# Patient Record
Sex: FEMALE | Race: WHITE | NOT HISPANIC OR LATINO | URBAN - METROPOLITAN AREA
[De-identification: names, ages, dates, MRNs, and addresses within clinical notes are randomized per-mention and may not be internally consistent; named-entity substitution may affect disease eponyms.]

---

## 2022-08-17 ENCOUNTER — HOSPITAL ENCOUNTER (INPATIENT)
Facility: HOSPITAL | Age: 72
LOS: 6 days | Discharge: NURSING, REHAB OR LONG-TERM CARE | DRG: 521 | End: 2022-08-23
Attending: EMERGENCY MEDICINE | Admitting: INTERNAL MEDICINE
Payer: MEDICARE

## 2022-08-17 DIAGNOSIS — S72.002A LEFT DISPLACED FEMORAL NECK FRACTURE: ICD-10-CM

## 2022-08-17 DIAGNOSIS — R09.02 HYPOXIA: ICD-10-CM

## 2022-08-17 DIAGNOSIS — J18.9 ATYPICAL PNEUMONIA: ICD-10-CM

## 2022-08-17 DIAGNOSIS — D72.829 LEUKOCYTOSIS, UNSPECIFIED TYPE: ICD-10-CM

## 2022-08-17 DIAGNOSIS — D64.9 POSTOPERATIVE ANEMIA: ICD-10-CM

## 2022-08-17 DIAGNOSIS — S72.002A LEFT DISPLACED FEMORAL NECK FRACTURE: Primary | ICD-10-CM

## 2022-08-17 DIAGNOSIS — N17.9 AKI (ACUTE KIDNEY INJURY): ICD-10-CM

## 2022-08-17 DIAGNOSIS — I10 PRIMARY HYPERTENSION: ICD-10-CM

## 2022-08-17 DIAGNOSIS — R07.9 CHEST PAIN: ICD-10-CM

## 2022-08-17 DIAGNOSIS — K59.09 CHRONIC CONSTIPATION: ICD-10-CM

## 2022-08-17 DIAGNOSIS — B20 HIV DISEASE: ICD-10-CM

## 2022-08-17 DIAGNOSIS — Z01.818 ENCOUNTER FOR PREOPERATIVE ANESTHESIOLOGY ASSESSMENT FOR VASCULAR SURGERY: ICD-10-CM

## 2022-08-17 LAB
ALBUMIN SERPL-MCNC: 2.8 GM/DL (ref 3.4–4.8)
ALBUMIN/GLOB SERPL: 0.8 RATIO (ref 1.1–2)
ALP SERPL-CCNC: 62 UNIT/L (ref 40–150)
ALT SERPL-CCNC: 19 UNIT/L (ref 0–55)
AST SERPL-CCNC: 35 UNIT/L (ref 5–34)
BASOPHILS # BLD AUTO: 0.07 X10(3)/MCL (ref 0–0.2)
BASOPHILS NFR BLD AUTO: 0.5 %
BILIRUBIN DIRECT+TOT PNL SERPL-MCNC: 0.3 MG/DL
BUN SERPL-MCNC: 19.6 MG/DL (ref 9.8–20.1)
CALCIUM SERPL-MCNC: 9 MG/DL (ref 8.4–10.2)
CHLORIDE SERPL-SCNC: 106 MMOL/L (ref 98–107)
CO2 SERPL-SCNC: 24 MMOL/L (ref 23–31)
CREAT SERPL-MCNC: 1.22 MG/DL (ref 0.55–1.02)
EOSINOPHIL # BLD AUTO: 0.52 X10(3)/MCL (ref 0–0.9)
EOSINOPHIL NFR BLD AUTO: 3.9 %
ERYTHROCYTE [DISTWIDTH] IN BLOOD BY AUTOMATED COUNT: 14.6 % (ref 11.5–17)
GFR SERPLBLD CREATININE-BSD FMLA CKD-EPI: 47 MLS/MIN/1.73/M2
GLOBULIN SER-MCNC: 3.6 GM/DL (ref 2.4–3.5)
GLUCOSE SERPL-MCNC: 97 MG/DL (ref 82–115)
HCT VFR BLD AUTO: 32.9 % (ref 37–47)
HGB BLD-MCNC: 10.2 GM/DL (ref 12–16)
IMM GRANULOCYTES # BLD AUTO: 0.05 X10(3)/MCL (ref 0–0.04)
IMM GRANULOCYTES NFR BLD AUTO: 0.4 %
LYMPHOCYTES # BLD AUTO: 1 X10(3)/MCL (ref 0.6–4.6)
LYMPHOCYTES NFR BLD AUTO: 7.4 %
MCH RBC QN AUTO: 28.7 PG (ref 27–31)
MCHC RBC AUTO-ENTMCNC: 31 MG/DL (ref 33–36)
MCV RBC AUTO: 92.4 FL (ref 80–94)
MONOCYTES # BLD AUTO: 0.49 X10(3)/MCL (ref 0.1–1.3)
MONOCYTES NFR BLD AUTO: 3.6 %
NEUTROPHILS # BLD AUTO: 11.3 X10(3)/MCL (ref 2.1–9.2)
NEUTROPHILS NFR BLD AUTO: 84.2 %
NRBC BLD AUTO-RTO: 0 %
PLATELET # BLD AUTO: 223 X10(3)/MCL (ref 130–400)
PMV BLD AUTO: 9.2 FL (ref 7.4–10.4)
POTASSIUM SERPL-SCNC: 4.1 MMOL/L (ref 3.5–5.1)
PROT SERPL-MCNC: 6.4 GM/DL (ref 5.8–7.6)
RBC # BLD AUTO: 3.56 X10(6)/MCL (ref 4.2–5.4)
SODIUM SERPL-SCNC: 140 MMOL/L (ref 136–145)
WBC # SPEC AUTO: 13.4 X10(3)/MCL (ref 4.5–11.5)

## 2022-08-17 PROCEDURE — 99285 EMERGENCY DEPT VISIT HI MDM: CPT | Mod: 25

## 2022-08-17 PROCEDURE — 85025 COMPLETE CBC W/AUTO DIFF WBC: CPT | Performed by: EMERGENCY MEDICINE

## 2022-08-17 PROCEDURE — 63600175 PHARM REV CODE 636 W HCPCS: Performed by: EMERGENCY MEDICINE

## 2022-08-17 PROCEDURE — 11000001 HC ACUTE MED/SURG PRIVATE ROOM

## 2022-08-17 PROCEDURE — 80053 COMPREHEN METABOLIC PANEL: CPT | Performed by: EMERGENCY MEDICINE

## 2022-08-17 PROCEDURE — 87635 SARS-COV-2 COVID-19 AMP PRB: CPT | Performed by: EMERGENCY MEDICINE

## 2022-08-17 PROCEDURE — 36415 COLL VENOUS BLD VENIPUNCTURE: CPT | Performed by: EMERGENCY MEDICINE

## 2022-08-17 PROCEDURE — 25000003 PHARM REV CODE 250: Performed by: EMERGENCY MEDICINE

## 2022-08-17 RX ORDER — MIDODRINE HYDROCHLORIDE 5 MG/1
5 TABLET ORAL 2 TIMES DAILY WITH MEALS
COMMUNITY

## 2022-08-17 RX ORDER — DONEPEZIL HYDROCHLORIDE 5 MG/1
5 TABLET, FILM COATED ORAL NIGHTLY
COMMUNITY

## 2022-08-17 RX ORDER — ASPIRIN 81 MG/1
81 TABLET ORAL DAILY
COMMUNITY

## 2022-08-17 RX ORDER — PANTOPRAZOLE SODIUM 40 MG/1
40 TABLET, DELAYED RELEASE ORAL DAILY
COMMUNITY

## 2022-08-17 RX ORDER — ROSUVASTATIN CALCIUM 40 MG/1
10 TABLET, COATED ORAL NIGHTLY
COMMUNITY

## 2022-08-17 RX ORDER — AMLODIPINE BESYLATE 5 MG/1
5 TABLET ORAL DAILY
COMMUNITY

## 2022-08-17 RX ORDER — TRAZODONE HYDROCHLORIDE 100 MG/1
100 TABLET ORAL NIGHTLY
COMMUNITY

## 2022-08-17 RX ORDER — OXYBUTYNIN CHLORIDE 10 MG/1
10 TABLET, EXTENDED RELEASE ORAL DAILY
COMMUNITY

## 2022-08-17 RX ADMIN — AZITHROMYCIN MONOHYDRATE 500 MG: 500 INJECTION, POWDER, LYOPHILIZED, FOR SOLUTION INTRAVENOUS at 11:08

## 2022-08-17 RX ADMIN — CEFTRIAXONE SODIUM 1 G: 1 INJECTION, POWDER, FOR SOLUTION INTRAMUSCULAR; INTRAVENOUS at 11:08

## 2022-08-18 ENCOUNTER — ANESTHESIA (OUTPATIENT)
Dept: SURGERY | Facility: HOSPITAL | Age: 72
DRG: 521 | End: 2022-08-18
Payer: MEDICARE

## 2022-08-18 ENCOUNTER — ANESTHESIA EVENT (OUTPATIENT)
Dept: SURGERY | Facility: HOSPITAL | Age: 72
DRG: 521 | End: 2022-08-18
Payer: MEDICARE

## 2022-08-18 PROBLEM — S72.002A LEFT DISPLACED FEMORAL NECK FRACTURE: Status: ACTIVE | Noted: 2022-08-18

## 2022-08-18 LAB
ALBUMIN SERPL-MCNC: 2.9 GM/DL (ref 3.4–4.8)
ALBUMIN SERPL-MCNC: 3.2 GM/DL (ref 3.4–4.8)
ALBUMIN/GLOB SERPL: 0.8 RATIO (ref 1.1–2)
ALBUMIN/GLOB SERPL: 0.9 RATIO (ref 1.1–2)
ALP SERPL-CCNC: 156 UNIT/L (ref 40–150)
ALP SERPL-CCNC: 161 UNIT/L (ref 40–150)
ALT SERPL-CCNC: 166 UNIT/L (ref 0–55)
ALT SERPL-CCNC: 204 UNIT/L (ref 0–55)
AST SERPL-CCNC: 435 UNIT/L (ref 5–34)
AST SERPL-CCNC: 787 UNIT/L (ref 5–34)
BASOPHILS # BLD AUTO: 0.04 X10(3)/MCL (ref 0–0.2)
BASOPHILS NFR BLD AUTO: 0.4 %
BILIRUBIN DIRECT+TOT PNL SERPL-MCNC: 0.6 MG/DL
BILIRUBIN DIRECT+TOT PNL SERPL-MCNC: 0.9 MG/DL
BUN SERPL-MCNC: 15.3 MG/DL (ref 9.8–20.1)
BUN SERPL-MCNC: 18.6 MG/DL (ref 9.8–20.1)
CALCIUM SERPL-MCNC: 8.9 MG/DL (ref 8.4–10.2)
CALCIUM SERPL-MCNC: 9.1 MG/DL (ref 8.4–10.2)
CHLORIDE SERPL-SCNC: 105 MMOL/L (ref 98–107)
CHLORIDE SERPL-SCNC: 107 MMOL/L (ref 98–107)
CO2 SERPL-SCNC: 17 MMOL/L (ref 23–31)
CO2 SERPL-SCNC: 28 MMOL/L (ref 23–31)
CREAT SERPL-MCNC: 0.98 MG/DL (ref 0.55–1.02)
CREAT SERPL-MCNC: 1.41 MG/DL (ref 0.55–1.02)
EOSINOPHIL # BLD AUTO: 0.5 X10(3)/MCL (ref 0–0.9)
EOSINOPHIL NFR BLD AUTO: 5.3 %
ERYTHROCYTE [DISTWIDTH] IN BLOOD BY AUTOMATED COUNT: 14.6 % (ref 11.5–17)
GFR SERPLBLD CREATININE-BSD FMLA CKD-EPI: 39 MLS/MIN/1.73/M2
GFR SERPLBLD CREATININE-BSD FMLA CKD-EPI: >60 MLS/MIN/1.73/M2
GLOBULIN SER-MCNC: 3.2 GM/DL (ref 2.4–3.5)
GLOBULIN SER-MCNC: 4.1 GM/DL (ref 2.4–3.5)
GLUCOSE SERPL-MCNC: 83 MG/DL (ref 82–115)
GLUCOSE SERPL-MCNC: 91 MG/DL (ref 82–115)
HCT VFR BLD AUTO: 42.1 % (ref 37–47)
HGB BLD-MCNC: 12.5 GM/DL (ref 12–16)
IMM GRANULOCYTES # BLD AUTO: 0.03 X10(3)/MCL (ref 0–0.04)
IMM GRANULOCYTES NFR BLD AUTO: 0.3 %
LYMPHOCYTES # BLD AUTO: 0.77 X10(3)/MCL (ref 0.6–4.6)
LYMPHOCYTES NFR BLD AUTO: 8.1 %
MAGNESIUM SERPL-MCNC: 2 MG/DL (ref 1.6–2.6)
MCH RBC QN AUTO: 28.9 PG (ref 27–31)
MCHC RBC AUTO-ENTMCNC: 29.7 MG/DL (ref 33–36)
MCV RBC AUTO: 97.5 FL (ref 80–94)
MONOCYTES # BLD AUTO: 0.35 X10(3)/MCL (ref 0.1–1.3)
MONOCYTES NFR BLD AUTO: 3.7 %
NEUTROPHILS # BLD AUTO: 7.8 X10(3)/MCL (ref 2.1–9.2)
NEUTROPHILS NFR BLD AUTO: 82.2 %
NRBC BLD AUTO-RTO: 0 %
PHOSPHATE SERPL-MCNC: 4.6 MG/DL (ref 2.3–4.7)
PLATELET # BLD AUTO: 203 X10(3)/MCL (ref 130–400)
PMV BLD AUTO: 9.5 FL (ref 7.4–10.4)
POTASSIUM SERPL-SCNC: 4.3 MMOL/L (ref 3.5–5.1)
POTASSIUM SERPL-SCNC: 4.9 MMOL/L (ref 3.5–5.1)
PROT SERPL-MCNC: 6.1 GM/DL (ref 5.8–7.6)
PROT SERPL-MCNC: 7.3 GM/DL (ref 5.8–7.6)
RBC # BLD AUTO: 4.32 X10(6)/MCL (ref 4.2–5.4)
SARS-COV-2 RDRP RESP QL NAA+PROBE: NEGATIVE
SODIUM SERPL-SCNC: 140 MMOL/L (ref 136–145)
SODIUM SERPL-SCNC: 142 MMOL/L (ref 136–145)
WBC # SPEC AUTO: 9.5 X10(3)/MCL (ref 4.5–11.5)

## 2022-08-18 PROCEDURE — 11000001 HC ACUTE MED/SURG PRIVATE ROOM

## 2022-08-18 PROCEDURE — 63600175 PHARM REV CODE 636 W HCPCS

## 2022-08-18 PROCEDURE — 99223 1ST HOSP IP/OBS HIGH 75: CPT | Mod: 57,,, | Performed by: REHABILITATION UNIT

## 2022-08-18 PROCEDURE — 99223 PR INITIAL HOSPITAL CARE,LEVL III: ICD-10-PCS | Mod: 57,,, | Performed by: REHABILITATION UNIT

## 2022-08-18 PROCEDURE — 36415 COLL VENOUS BLD VENIPUNCTURE: CPT | Performed by: NURSE PRACTITIONER

## 2022-08-18 PROCEDURE — 83735 ASSAY OF MAGNESIUM: CPT | Performed by: NURSE PRACTITIONER

## 2022-08-18 PROCEDURE — 25000003 PHARM REV CODE 250: Performed by: INTERNAL MEDICINE

## 2022-08-18 PROCEDURE — 85025 COMPLETE CBC W/AUTO DIFF WBC: CPT | Performed by: NURSE PRACTITIONER

## 2022-08-18 PROCEDURE — 80053 COMPREHEN METABOLIC PANEL: CPT | Performed by: PHYSICIAN ASSISTANT

## 2022-08-18 PROCEDURE — 80053 COMPREHEN METABOLIC PANEL: CPT | Performed by: NURSE PRACTITIONER

## 2022-08-18 PROCEDURE — 36415 COLL VENOUS BLD VENIPUNCTURE: CPT | Performed by: PHYSICIAN ASSISTANT

## 2022-08-18 PROCEDURE — 84100 ASSAY OF PHOSPHORUS: CPT | Performed by: NURSE PRACTITIONER

## 2022-08-18 PROCEDURE — 27000221 HC OXYGEN, UP TO 24 HOURS

## 2022-08-18 PROCEDURE — 63600175 PHARM REV CODE 636 W HCPCS: Performed by: PHYSICIAN ASSISTANT

## 2022-08-18 PROCEDURE — 63600175 PHARM REV CODE 636 W HCPCS: Performed by: INTERNAL MEDICINE

## 2022-08-18 PROCEDURE — 25000003 PHARM REV CODE 250: Performed by: PHYSICIAN ASSISTANT

## 2022-08-18 RX ORDER — TRAZODONE HYDROCHLORIDE 100 MG/1
100 TABLET ORAL NIGHTLY
Status: DISCONTINUED | OUTPATIENT
Start: 2022-08-18 | End: 2022-08-23 | Stop reason: HOSPADM

## 2022-08-18 RX ORDER — PANTOPRAZOLE SODIUM 40 MG/1
40 TABLET, DELAYED RELEASE ORAL DAILY
Status: DISCONTINUED | OUTPATIENT
Start: 2022-08-19 | End: 2022-08-23 | Stop reason: HOSPADM

## 2022-08-18 RX ORDER — HYDRALAZINE HYDROCHLORIDE 20 MG/ML
10 INJECTION INTRAMUSCULAR; INTRAVENOUS
Status: DISCONTINUED | OUTPATIENT
Start: 2022-08-18 | End: 2022-08-23 | Stop reason: HOSPADM

## 2022-08-18 RX ORDER — AMLODIPINE BESYLATE 5 MG/1
5 TABLET ORAL DAILY
Status: DISCONTINUED | OUTPATIENT
Start: 2022-08-19 | End: 2022-08-23 | Stop reason: HOSPADM

## 2022-08-18 RX ORDER — GABAPENTIN 300 MG/1
300 CAPSULE ORAL 2 TIMES DAILY
Status: DISCONTINUED | OUTPATIENT
Start: 2022-08-18 | End: 2022-08-23 | Stop reason: HOSPADM

## 2022-08-18 RX ORDER — BACLOFEN 10 MG/1
10 TABLET ORAL 3 TIMES DAILY
Status: DISCONTINUED | OUTPATIENT
Start: 2022-08-18 | End: 2022-08-23 | Stop reason: HOSPADM

## 2022-08-18 RX ORDER — BACLOFEN 10 MG/1
10 TABLET ORAL 3 TIMES DAILY
COMMUNITY

## 2022-08-18 RX ORDER — MELATONIN 10 MG
CAPSULE ORAL
COMMUNITY

## 2022-08-18 RX ORDER — DOCUSATE SODIUM 100 MG/1
100 CAPSULE, LIQUID FILLED ORAL NIGHTLY
COMMUNITY

## 2022-08-18 RX ORDER — ACETAMINOPHEN 500 MG
5000 TABLET ORAL DAILY
COMMUNITY

## 2022-08-18 RX ORDER — MIDODRINE HYDROCHLORIDE 5 MG/1
5 TABLET ORAL 2 TIMES DAILY WITH MEALS
Status: DISCONTINUED | OUTPATIENT
Start: 2022-08-18 | End: 2022-08-23 | Stop reason: HOSPADM

## 2022-08-18 RX ORDER — VORTIOXETINE 10 MG/1
TABLET, FILM COATED ORAL
COMMUNITY

## 2022-08-18 RX ORDER — ENOXAPARIN SODIUM 100 MG/ML
40 INJECTION SUBCUTANEOUS EVERY 24 HOURS
Status: DISCONTINUED | OUTPATIENT
Start: 2022-08-18 | End: 2022-08-23 | Stop reason: HOSPADM

## 2022-08-18 RX ORDER — OXYBUTYNIN CHLORIDE 5 MG/1
5 TABLET ORAL DAILY
Status: DISCONTINUED | OUTPATIENT
Start: 2022-08-19 | End: 2022-08-23 | Stop reason: HOSPADM

## 2022-08-18 RX ORDER — MEMANTINE HYDROCHLORIDE 5 MG/1
5 TABLET ORAL 2 TIMES DAILY
Status: DISCONTINUED | OUTPATIENT
Start: 2022-08-18 | End: 2022-08-23 | Stop reason: HOSPADM

## 2022-08-18 RX ORDER — MEMANTINE HYDROCHLORIDE 10 MG/1
5 TABLET ORAL 2 TIMES DAILY
COMMUNITY

## 2022-08-18 RX ORDER — DOCUSATE SODIUM 100 MG/1
100 CAPSULE, LIQUID FILLED ORAL NIGHTLY
Status: DISCONTINUED | OUTPATIENT
Start: 2022-08-18 | End: 2022-08-23 | Stop reason: HOSPADM

## 2022-08-18 RX ORDER — NALOXONE HCL 0.4 MG/ML
0.02 VIAL (ML) INJECTION
Status: DISCONTINUED | OUTPATIENT
Start: 2022-08-18 | End: 2022-08-23 | Stop reason: HOSPADM

## 2022-08-18 RX ORDER — TALC
6 POWDER (GRAM) TOPICAL NIGHTLY PRN
Status: DISCONTINUED | OUTPATIENT
Start: 2022-08-18 | End: 2022-08-21

## 2022-08-18 RX ORDER — ONDANSETRON 2 MG/ML
4 INJECTION INTRAMUSCULAR; INTRAVENOUS EVERY 4 HOURS PRN
Status: DISCONTINUED | OUTPATIENT
Start: 2022-08-18 | End: 2022-08-23 | Stop reason: HOSPADM

## 2022-08-18 RX ORDER — DONEPEZIL HYDROCHLORIDE 5 MG/1
5 TABLET, FILM COATED ORAL NIGHTLY
Status: DISCONTINUED | OUTPATIENT
Start: 2022-08-18 | End: 2022-08-23 | Stop reason: HOSPADM

## 2022-08-18 RX ORDER — MORPHINE SULFATE 4 MG/ML
2 INJECTION, SOLUTION INTRAMUSCULAR; INTRAVENOUS EVERY 4 HOURS PRN
Status: DISPENSED | OUTPATIENT
Start: 2022-08-18 | End: 2022-08-20

## 2022-08-18 RX ORDER — SODIUM CHLORIDE 0.9 % (FLUSH) 0.9 %
10 SYRINGE (ML) INJECTION EVERY 12 HOURS PRN
Status: DISCONTINUED | OUTPATIENT
Start: 2022-08-18 | End: 2022-08-23 | Stop reason: HOSPADM

## 2022-08-18 RX ORDER — SODIUM CHLORIDE 9 MG/ML
INJECTION, SOLUTION INTRAVENOUS CONTINUOUS
Status: DISCONTINUED | OUTPATIENT
Start: 2022-08-18 | End: 2022-08-20

## 2022-08-18 RX ORDER — GABAPENTIN 300 MG/1
300 CAPSULE ORAL 2 TIMES DAILY
COMMUNITY

## 2022-08-18 RX ADMIN — SODIUM CHLORIDE: 9 INJECTION, SOLUTION INTRAVENOUS at 06:08

## 2022-08-18 RX ADMIN — TRAZODONE HYDROCHLORIDE 100 MG: 100 TABLET ORAL at 08:08

## 2022-08-18 RX ADMIN — GABAPENTIN 300 MG: 300 CAPSULE ORAL at 08:08

## 2022-08-18 RX ADMIN — MORPHINE SULFATE 2 MG: 4 INJECTION INTRAVENOUS at 09:08

## 2022-08-18 RX ADMIN — AZITHROMYCIN MONOHYDRATE 500 MG: 500 INJECTION, POWDER, LYOPHILIZED, FOR SOLUTION INTRAVENOUS at 11:08

## 2022-08-18 RX ADMIN — DONEPEZIL HYDROCHLORIDE 5 MG: 5 TABLET, FILM COATED ORAL at 08:08

## 2022-08-18 RX ADMIN — DOCUSATE SODIUM 100 MG: 100 CAPSULE, LIQUID FILLED ORAL at 08:08

## 2022-08-18 RX ADMIN — BACLOFEN 10 MG: 10 TABLET ORAL at 08:08

## 2022-08-18 RX ADMIN — MEMANTINE 5 MG: 5 TABLET ORAL at 08:08

## 2022-08-18 RX ADMIN — ONDANSETRON 4 MG: 2 INJECTION INTRAMUSCULAR; INTRAVENOUS at 02:08

## 2022-08-18 NOTE — ED NOTES
"ARRIVED VIA EMS FROM HOME AFTER FALL. WALKING ACROSS LIVING ROOM AND "JUST FELL". DENIES LOC HEAD OR BACK PAIN. C/O L HIP PAIN 8/10. MORPHINE 10 MG GIVEN IN ROUTE PT SLEEPS WHEN NOT BEING TALKED TO SOME CONFUSION RESP SHALLOW 12 O2 SAT 78% RA INSTRUCTED TO DEEP BREATH OS UP TO 82% PLACED ON O2 2 L NC SAT UP 96%. L LEG SHORTENED AND ROTATED 20G IN L FA STATED BY EMS  "

## 2022-08-18 NOTE — ED PROVIDER NOTES
"Encounter Date: 8/17/2022    SCRIBE #1 NOTE: IYuridia, am scribing for, and in the presence of,  Violet Metcalf MD. I have scribed the following portions of the note - Other sections scribed: HPI, ROS, PE.   SCRIBE #2 NOTE: IVivi, am scribing for, and in the presence of,  Violet Metcalf MD. I have scribed the remaining portions of the note not scribed by Scribe #1.     History     Chief Complaint   Patient presents with    Fall     Going to bathroom lost balance and fell, l hip pain w/o shortening or rotation, pms intact     IViolet MD, assumed care of pt at 2035.    73 y/o female with hx of HTN, HIV positive (undetectable), and HLD presents to ER after a fall on her left hip PTA. Pt states she suddenly fell while ambulating. Pt also notes that after the fall she is having "trouble breathing". She reports having frequent falls within the last few weeks. Pt denies any lightheadedness or dizziness prior to fall. She denies chest pain. Pt tetanus status unknown.     The history is provided by the patient. No  was used.   Fall  The accident occurred just prior to arrival. The fall occurred while walking. She fell from a height of 1 to 2 ft. She landed on a hard floor. The point of impact was the left hip. The pain is present in the left hip. The pain is at a severity of 7/10. She was not ambulatory at the scene. There was no entrapment after the fall. There was no drug use involved in the accident. There was no alcohol use involved in the accident. Pertinent negatives include no back pain, no fever, no abdominal pain, no nausea, no vomiting, no hematuria and no headaches. The symptoms are aggravated by activity. Treatment on scene includes medications. She has tried rest for the symptoms. The treatment provided moderate relief.     Review of patient's allergies indicates:   Allergen Reactions    Codeine      Past Medical History:   Diagnosis Date    Depression     " Hypertension     Mixed hyperlipidemia     Stress incontinence of urine      No past surgical history on file.  History reviewed. No pertinent family history.     Review of Systems   Constitutional: Negative for chills, diaphoresis and fever.   HENT: Negative for congestion, ear pain, sinus pain and sore throat.    Eyes: Negative for pain, discharge and visual disturbance.   Respiratory: Positive for shortness of breath. Negative for cough, wheezing and stridor.    Cardiovascular: Negative for chest pain, palpitations and leg swelling.   Gastrointestinal: Negative for abdominal pain, constipation, diarrhea, nausea, rectal pain and vomiting.   Genitourinary: Negative for dysuria and hematuria.   Musculoskeletal: Positive for arthralgias. Negative for back pain and myalgias.        L Hip pain   Skin: Negative for rash.   Neurological: Negative for dizziness, syncope, light-headedness and headaches.   Hematological: Negative.    Psychiatric/Behavioral: Negative.    All other systems reviewed and are negative.      Physical Exam     Initial Vitals [08/17/22 1935]   BP Pulse Resp Temp SpO2   139/65 91 18 98.2 °F (36.8 °C) 98 %      MAP       --         Physical Exam    Nursing note and vitals reviewed.  Constitutional: She appears well-developed and well-nourished. She is not diaphoretic. No distress.   HENT:   Head: Normocephalic and atraumatic.   Nose: Nose normal.   Mouth/Throat: Oropharynx is clear and moist.   Eyes: Conjunctivae and EOM are normal. Pupils are equal, round, and reactive to light.   Neck: Trachea normal. Neck supple.   Normal range of motion.  Cardiovascular: Normal rate, regular rhythm, normal heart sounds and intact distal pulses.   No murmur heard.  Pulmonary/Chest: Breath sounds normal. No respiratory distress. She has no wheezes. She has no rhonchi. She has no rales. She exhibits no tenderness.   Abdominal: Abdomen is soft. Bowel sounds are normal. She exhibits no distension and no mass. There  is no abdominal tenderness. There is no rebound and no guarding.   Musculoskeletal:         General: Tenderness present. No edema.      Cervical back: Normal range of motion and neck supple.      Lumbar back: Normal. Normal range of motion.      Comments: Tender left lateral hip     Neurological: She is alert and oriented to person, place, and time. She has normal strength. No cranial nerve deficit or sensory deficit.   Skin: Skin is warm and dry. Capillary refill takes less than 2 seconds. No abscess noted. No erythema. No pallor.   Bandaged abrasions to left forearm   Psychiatric: She has a normal mood and affect. Her behavior is normal. Judgment and thought content normal.         ED Course   Procedures  Labs Reviewed   COMPREHENSIVE METABOLIC PANEL - Abnormal; Notable for the following components:       Result Value    Creatinine 1.22 (*)     Albumin Level 2.8 (*)     Globulin 3.6 (*)     Albumin/Globulin Ratio 0.8 (*)     Aspartate Aminotransferase 35 (*)     All other components within normal limits   CBC WITH DIFFERENTIAL - Abnormal; Notable for the following components:    WBC 13.4 (*)     RBC 3.56 (*)     Hgb 10.2 (*)     Hct 32.9 (*)     MCHC 31.0 (*)     Neut # 11.3 (*)     IG# 0.05 (*)     All other components within normal limits   SARS-COV-2 RNA AMPLIFICATION, QUAL - Normal   CBC W/ AUTO DIFFERENTIAL    Narrative:     The following orders were created for panel order CBC auto differential.  Procedure                               Abnormality         Status                     ---------                               -----------         ------                     CBC with Differential[[id number]]        Abnormal            Final result                 Please view results for these tests on the individual orders.          Imaging Results          X-Ray Hip 2 or 3 views Left (with Pelvis when performed) (Preliminary result)  Result time 08/17/22 22:33:21    ED Interpretation by Violet Galeana MD  (08/17/22 22:33:21, [facility] - Emergency Dept, Emergency Medicine)    Left femoral neck fracture                             X-Ray Chest AP Portable (Preliminary result)  Result time 08/17/22 22:33:12    ED Interpretation by Violet Galeana MD (08/17/22 22:33:12, [facility] - Emergency Dept, Emergency Medicine)    ?haziness                            X-Rays:   Independently Interpreted Readings:   Other Readings:  Femoral neck fracture    Medications   sodium chloride 0.9% flush 10 mL (has no administration in time range)   naloxone 0.4 mg/mL injection 0.02 mg (has no administration in time range)   cefTRIAXone (ROCEPHIN) 1 g in dextrose 5 % in water (D5W) 5 % 50 mL IVPB (MB+) (has no administration in time range)   azithromycin (ZITHROMAX) 500 mg in dextrose 5 % 250 mL IVPB (has no administration in time range)   enoxaparin injection 40 mg (has no administration in time range)   cefTRIAXone (ROCEPHIN) 1 g in dextrose 5 % in water (D5W) 5 % 50 mL IVPB (MB+) (0 g Intravenous Stopped 8/17/22 2337)   azithromycin 500 mg in dextrose 5 % 250 mL IVPB (ready to mix system) (0 mg Intravenous Stopped 8/18/22 0044)     Medical Decision Making:   History:   I obtained history from: EMS provider.  Old Records Summarized: records from previous admission(s).       <> Summary of Records: hiv  Initial Assessment:   Mechanical fall with hip pain  Differential Diagnosis:   Hip fracture, femur fracture,hip pain, pneumothorax, pneumonia  Clinical Tests:   Lab Tests: Ordered and Reviewed  Radiological Study: Ordered and Reviewed  ED Management:  Abx due to hypoxia, leukocytosis and cxr concerning for possible atypical pneumonia, discussed with ortho and admitted to hosptialist  Other:   I have discussed this case with another health care provider.          Scribe Attestation:   Scribe #1: I performed the above scribed service and the documentation accurately describes the services I performed. I attest  to the accuracy of the note.  Scribe #2: I performed the above scribed service and the documentation accurately describes the services I performed. I attest to the accuracy of the note.  Comments: Attending:   Physician Attestation Statement for Scribe #1: I, Violet Galeana MD, personally performed the services described in this documentation. All medical record entries made by the scribe were at my direction and in my presence.  I have reviewed the chart and agree that the record reflects my personal performance and is accurate and complete.      Attending Attestation:           Physician Attestation for Scribe:  Physician Attestation Statement for Scribe #1: I, Violet Metcalf MD, reviewed documentation, as scribed by Yuridia Mcfadden in my presence, and it is both accurate and complete.   Physician Attestation Statement for Scribe #2: I, Violet Metcalf MD, reviewed documentation, as scribed by Vivi Parra in my presence, and it is both accurate and complete. I also acknowledge and confirm the content of the note done by Scribe #1.          ED Course as of 08/18/22 0359   Wed Aug 17, 2022   2152 Consult to Dr. Ac [BS]   2228 Consult to hospitalist [BS]      ED Course User Index  [BS] Violet Galeana MD             Clinical Impression:   Final diagnoses:  [S72.002A] Left displaced femoral neck fracture  [R09.02] Hypoxia (Primary)  [J18.9] Atypical pneumonia  [D72.829] Leukocytosis, unspecified type          ED Disposition Condition    Admit               Violet Galeana MD  08/18/22 0359

## 2022-08-18 NOTE — PLAN OF CARE
Visited with pt and her sister Doreen Foy who is at bedside. Her phone number is [phone]. They reside together in a mobile home in [place], there is a ramp and pt uses a front wheeled walker that has been purchased in the last few years. She also has a quad cane that she doesn't use.   PCP is Allen Limon  Pts [facility] [address] in [place]  [facility] is pts home health agency and Lula Kyle is her nurse  Updates sent to [facility]  Pt did fall at home fracturing her left hip  Pt was started in atb for pneumonia is on 3 L here but not at home. Had covid a few weeks ago but is now testing neg.   They would like rehab at [facility] rehab at [facility]. Referral sent in preparation

## 2022-08-18 NOTE — FIRST PROVIDER EVALUATION
"Medical screening exam completed.  I have conducted a focused provider triage encounter, findings are as follows:    Chief Complaint   Patient presents with    Fall     Going to bathroom lost balance and fell, l hip pain w/o shortening or rotation, pms intact     Brief history of present illness:  72 y.o. female presents to the ED with left hip pain s/t fall onset PTA while going to the bathroom.     Vitals:    08/17/22 1935   BP: 139/65   Pulse: 91   Resp: 18   Temp: 98.2 °F (36.8 °C)   TempSrc: Oral   SpO2: 98%   Weight: 77.1 kg (170 lb)   Height: 5' 5" (1.651 m)       Pertinent physical exam:  Awake, alert, non-labored respirations    Brief workup plan:  Imaging     Preliminary workup initiated; this workup will be continued and followed by the physician or advanced practice provider that is assigned to the patient when roomed.  "

## 2022-08-18 NOTE — H&P
[facility] Hospital Medicine History & Physical Examination       Patient Name: Susy Foy  MRN: [id number]  Patient Class: IP- Inpatient   Admission Date: 8/17/2022   Admitting Physician: Masoud Pressley MD   Length of Stay: 1  Attending Physician: Juan Jose Medina MD  Primary Care Provider: [facility]  Face-to-Face encounter date: 08/18/2022  Code Status: DNR  Chief Complaint: Fall (Going to bathroom lost balance and fell, l hip pain w/o shortening or rotation, pms intact)        Patient information was obtained from patient, patient's family, past medical records and ER records.     HISTORY OF PRESENT ILLNESS:   Susy Foy is a 72 y.o. White female with a past medical history of hypertension, hyperlipidemia, HIV positive on Cabenuva (undetectable), and dementia. The patient presented to [facility] on 8/17/2022 with a primary complaint of left hip pain following a fall.  Patient states she was walking to the bathroom using her walker when she fell onto the left side.  She was able to get up but unable to bear weight following a fall.  She states pain is localized to the left hip.  She reports other symptoms of a nonproductive cough.  She denies complaints of chest pain, shortness a breath, fever, abdominal pain, nausea, vomiting, diarrhea and urinary complaints.  Patient lives with her sister who is at bedside.  At baseline she ambulates with a walker.  She does not need assistance with activities of daily living.  Of note patient reports she tested positive for COVID a couple weeks ago she with antibiotics.    Upon presentation to the ED, patient afebrile, normotensive and SpO2 98% on room air.  Oxygen saturation decreased to 91% patient was placed on 3 L nasal cannula.  Labs with WBC 13.4, H&H 10.2/32.9, MCV 92, BUN/creatinine 19.6/1.22, AST 35 and ALT 19.  Labs this morning (08/18/2022) hemoglobin and hematocrit increased to 12.5/42.1 and liver enzymes also elevated at with   and .  COVID-19 rapid test negative.  Chest x-ray without acute cardiopulmonary processes but prominent interstitial markings without focal opacification.  Hip x-ray with left hip fracture.  While in the ED patient was started on azithromycin and Rocephin for pneumonia.  Patient is admitted to hospital medicine services and further medical management.    PAST MEDICAL HISTORY:   Hypertension   Hyperlipidemia  Stress Incontinence   HIV  Dementia    PAST SURGICAL HISTORY:   Back surgery   Hysterectomy  Tonsillectomy  Appendectomy   Cholecystectomy    ALLERGIES:   Codeine    FAMILY HISTORY:   Mother: Lung cancer, cardiovascular disease   Father: COPD    SOCIAL HISTORY:   Denies tobacco, alcohol and illicit drug use    HOME MEDICATIONS:     Prior to Admission medications    Medication Sig Start Date End Date Taking? Authorizing Provider   amLODIPine (NORVASC) 5 MG tablet Take 5 mg by mouth once daily.   Yes Historical Provider   aspirin (ECOTRIN) 81 MG EC tablet Take 81 mg by mouth once daily.   Yes Historical Provider   donepeziL (ARICEPT) 5 MG tablet Take 5 mg by mouth every evening.   Yes Historical Provider   midodrine (PROAMATINE) 5 MG Tab Take 5 mg by mouth 2 (two) times daily with meals.   Yes Historical Provider   oxybutynin (DITROPAN-XL) 10 MG 24 hr tablet Take 10 mg by mouth once daily.   Yes Historical Provider   pantoprazole (PROTONIX) 40 MG tablet Take 40 mg by mouth once daily.   Yes Historical Provider   rosuvastatin (CRESTOR) 40 MG Tab Take 10 mg by mouth every evening.   Yes Historical Provider   traZODone (DESYREL) 100 MG tablet Take 100 mg by mouth every evening.   Yes Historical Provider       REVIEW OF SYSTEMS:   Except as documented, all other systems reviewed and negative     PHYSICAL EXAM:     VITAL SIGNS: 24 HRS MIN & MAX LAST   Temp  Min: 97.9 °F (36.6 °C)  Max: 98.8 °F (37.1 °C) 98.8 °F (37.1 °C)   BP  Min: 139/65  Max: 154/81 (!) 142/74   Pulse  Min: 58  Max: 104  71   Resp  Min: 17   Max: 19 17   SpO2  Min: 90 %  Max: 98 % 96 %       General appearance: Well-developed, well-nourished female in no apparent distress.  Sister at bedside.  HEENT: Atraumatic head. Moist mucous membranes of oral cavity.  Lungs: Clear to auscultation bilaterally.   Heart: Regular rate and rhythm.   Abdomen: Soft, non-distended, non-tender. Bowel sounds are normal.   Extremities: No cyanosis, clubbing.  External rotation of left hip with shortening of left leg, neurovascularly intact.  Skin: No Rash. Warm and dry.  Neuro: Awake, alert and oriented. Motor and sensory exams grossly intact.  Psych/mental status: Appropriate mood and affect. Cooperative. Responds appropriately to questions.       LABS AND IMAGING:     Recent Labs   Lab 08/17/22 2049 08/18/22 0459   WBC 13.4* 9.5   RBC 3.56* 4.32   HGB 10.2* 12.5   HCT 32.9* 42.1   MCV 92.4 97.5*   MCH 28.7 28.9   MCHC 31.0* 29.7*   RDW 14.6 14.6    203   MPV 9.2 9.5       Recent Labs   Lab 08/17/22 2049 08/18/22  0459    140   K 4.1 4.9   CO2 24 17*   BUN 19.6 18.6   CREATININE 1.22* 1.41*   CALCIUM 9.0 9.1   MG  --  2.00   ALBUMIN 2.8* 3.2*   ALKPHOS 62 161*   ALT 19 204*   AST 35* 787*   BILITOT 0.3 0.9       Microbiology Results (last 7 days)     ** No results found for the last 168 hours. **           X-Ray Chest AP Portable  Narrative: EXAMINATION:  XR CHEST AP PORTABLE    CLINICAL HISTORY:  shortness of breath;    TECHNIQUE:  Single view of the chest    COMPARISON:  No prior imaging available for comparison.    FINDINGS:  No focal opacification, pleural effusion, or pneumothorax.    The cardiomediastinal silhouette is within normal limits.    No acute osseous abnormality.  Impression: No acute cardiopulmonary process.  Prominent interstitial markings with no focal opacification.    Electronically signed by: Javier Lerner  Date:    08/18/2022  Time:    07:08        ASSESSMENT & PLAN:   Assessment:  Left hip fracture  Community-acquired pneumonia  Acute  kidney injury  Transaminitis  Essential hypertension   HIV positive on Cabenuva    Plan:   - Ortho consulted appreciate recommendations  - IV Morphine for pain  - Continue with Rocephin and azithromycin  - Right upper quadrant ultrasound ordered  - Will repeat CMP to further evaluate LFT  - IV fluid hydration  - IV hydralazine as needed for hypertension  - Resume appropriate home medication  - Labs in AM        VTE Prophylaxis: will be placed on SCD for DVT prophylaxis and will be advised to be as mobile as possible and sit in a chair as tolerated      __________________________________________________________________________  INPATIENT LIST OF MEDICATIONS     Current Facility-Administered Medications:     azithromycin (ZITHROMAX) 500 mg in dextrose 5 % 250 mL IVPB, 500 mg, Intravenous, Q24H, Ruth Hammond AGACNP-BC    cefTRIAXone (ROCEPHIN) 1 g in dextrose 5 % in water (D5W) 5 % 50 mL IVPB (MB+), 1 g, Intravenous, Q24H, Ruth Hammond AGACNP-BC    enoxaparin injection 40 mg, 40 mg, Subcutaneous, Daily, Ruth Hammond AGACNP-BC    naloxone 0.4 mg/mL injection 0.02 mg, 0.02 mg, Intravenous, PRN, Ruth Hammond, AGACNP-BC    sodium chloride 0.9% flush 10 mL, 10 mL, Intravenous, Q12H PRN, Ruth Hammond AGACNP-BC      Scheduled Meds:   azithromycin (ZITHROMAX) 500 mg IVPB  500 mg Intravenous Q24H    cefTRIAXone (ROCEPHIN) IVPB  1 g Intravenous Q24H    enoxaparin  40 mg Subcutaneous Daily     Continuous Infusions:  PRN Meds:.naloxone, sodium chloride 0.9%      Discharge Planning and Disposition: Anticipated discharge to be determined.    Rose Marie QUINONES PA, have reviewed and discussed the case with Dr. Juan Jose Medina.    Please see the following addendum for further assessment and plan from there attending MD.    Rose Marie Galvan PA-C  08/18/2022    ________________________________________________________________________________    MD Addendum:  Dr. Juan Jose QUINONES, assumed care of this patient today at  ---am/pm  For the patient encounter, I performed the substantive portion of the visit, I reviewed the NP/PA documentation, treatment plan, and medical decision making.  I had face to face time with this patient     A. History:    B. Physical exam:    C. Medical decision making:      All diagnosis and differential diagnosis have been reviewed; assessment and plan has been documented; I have personally reviewed the labs and test results that are presently available; I have reviewed the patients medication list; I have reviewed the consulting providers response and recommendations. I have reviewed or attempted to review medical records based upon their availability.    All of the patient and family questions have been addressed and answered. Patient's is agreeable to the above stated plan. I will continue to monitor closely and make adjustments to medical management as needed.      Juan Jose Medina MD  08/18/2022

## 2022-08-19 LAB
ALBUMIN SERPL-MCNC: 2.4 GM/DL (ref 3.4–4.8)
ALBUMIN/GLOB SERPL: 0.8 RATIO (ref 1.1–2)
ALP SERPL-CCNC: 120 UNIT/L (ref 40–150)
ALT SERPL-CCNC: 90 UNIT/L (ref 0–55)
AST SERPL-CCNC: 131 UNIT/L (ref 5–34)
BASOPHILS # BLD AUTO: 0.07 X10(3)/MCL (ref 0–0.2)
BASOPHILS NFR BLD AUTO: 0.7 %
BILIRUBIN DIRECT+TOT PNL SERPL-MCNC: 0.5 MG/DL
BUN SERPL-MCNC: 12.7 MG/DL (ref 9.8–20.1)
CALCIUM SERPL-MCNC: 8.2 MG/DL (ref 8.4–10.2)
CHLORIDE SERPL-SCNC: 102 MMOL/L (ref 98–107)
CO2 SERPL-SCNC: 27 MMOL/L (ref 23–31)
CREAT SERPL-MCNC: 0.77 MG/DL (ref 0.55–1.02)
EOSINOPHIL # BLD AUTO: 1.01 X10(3)/MCL (ref 0–0.9)
EOSINOPHIL NFR BLD AUTO: 9.6 %
ERYTHROCYTE [DISTWIDTH] IN BLOOD BY AUTOMATED COUNT: 14.5 % (ref 11.5–17)
GFR SERPLBLD CREATININE-BSD FMLA CKD-EPI: >60 MLS/MIN/1.73/M2
GLOBULIN SER-MCNC: 2.9 GM/DL (ref 2.4–3.5)
GLUCOSE SERPL-MCNC: 86 MG/DL (ref 82–115)
GROUP & RH: NORMAL
HAV IGM SERPL QL IA: NONREACTIVE
HBV CORE IGM SERPL QL IA: NONREACTIVE
HBV SURFACE AG SERPL QL IA: NONREACTIVE
HCT VFR BLD AUTO: 30.9 % (ref 37–47)
HCV AB SERPL QL IA: NONREACTIVE
HGB BLD-MCNC: 9.7 GM/DL (ref 12–16)
IMM GRANULOCYTES # BLD AUTO: 0.05 X10(3)/MCL (ref 0–0.04)
IMM GRANULOCYTES NFR BLD AUTO: 0.5 %
INDIRECT COOMBS GEL: NORMAL
LYMPHOCYTES # BLD AUTO: 1.31 X10(3)/MCL (ref 0.6–4.6)
LYMPHOCYTES NFR BLD AUTO: 12.5 %
MCH RBC QN AUTO: 29.3 PG (ref 27–31)
MCHC RBC AUTO-ENTMCNC: 31.4 MG/DL (ref 33–36)
MCV RBC AUTO: 93.4 FL (ref 80–94)
MONOCYTES # BLD AUTO: 0.61 X10(3)/MCL (ref 0.1–1.3)
MONOCYTES NFR BLD AUTO: 5.8 %
NEUTROPHILS # BLD AUTO: 7.4 X10(3)/MCL (ref 2.1–9.2)
NEUTROPHILS NFR BLD AUTO: 70.9 %
NRBC BLD AUTO-RTO: 0 %
PLATELET # BLD AUTO: 158 X10(3)/MCL (ref 130–400)
PMV BLD AUTO: 9.7 FL (ref 7.4–10.4)
POTASSIUM SERPL-SCNC: 3.5 MMOL/L (ref 3.5–5.1)
PROT SERPL-MCNC: 5.3 GM/DL (ref 5.8–7.6)
RBC # BLD AUTO: 3.31 X10(6)/MCL (ref 4.2–5.4)
SODIUM SERPL-SCNC: 136 MMOL/L (ref 136–145)
WBC # SPEC AUTO: 10.5 X10(3)/MCL (ref 4.5–11.5)

## 2022-08-19 PROCEDURE — C1776 JOINT DEVICE (IMPLANTABLE): HCPCS | Performed by: ORTHOPAEDIC SURGERY

## 2022-08-19 PROCEDURE — 71000039 HC RECOVERY, EACH ADD'L HOUR: Performed by: ORTHOPAEDIC SURGERY

## 2022-08-19 PROCEDURE — 27000221 HC OXYGEN, UP TO 24 HOURS

## 2022-08-19 PROCEDURE — 27236 PR FEMORAL FX, OPEN TX: ICD-10-PCS | Mod: AS,LT,, | Performed by: NURSE PRACTITIONER

## 2022-08-19 PROCEDURE — 87040 BLOOD CULTURE FOR BACTERIA: CPT | Performed by: INTERNAL MEDICINE

## 2022-08-19 PROCEDURE — 63600175 PHARM REV CODE 636 W HCPCS: Performed by: NURSE ANESTHETIST, CERTIFIED REGISTERED

## 2022-08-19 PROCEDURE — P9045 ALBUMIN (HUMAN), 5%, 250 ML: HCPCS | Mod: JG

## 2022-08-19 PROCEDURE — 86850 RBC ANTIBODY SCREEN: CPT | Performed by: INTERNAL MEDICINE

## 2022-08-19 PROCEDURE — 27201423 OPTIME MED/SURG SUP & DEVICES STERILE SUPPLY: Performed by: ORTHOPAEDIC SURGERY

## 2022-08-19 PROCEDURE — 36415 COLL VENOUS BLD VENIPUNCTURE: CPT | Performed by: INTERNAL MEDICINE

## 2022-08-19 PROCEDURE — A4216 STERILE WATER/SALINE, 10 ML: HCPCS | Performed by: ORTHOPAEDIC SURGERY

## 2022-08-19 PROCEDURE — 37000008 HC ANESTHESIA 1ST 15 MINUTES: Performed by: ORTHOPAEDIC SURGERY

## 2022-08-19 PROCEDURE — 27236 PR FEMORAL FX, OPEN TX: ICD-10-PCS | Mod: LT,,, | Performed by: ORTHOPAEDIC SURGERY

## 2022-08-19 PROCEDURE — 36000711: Performed by: ORTHOPAEDIC SURGERY

## 2022-08-19 PROCEDURE — 97162 PT EVAL MOD COMPLEX 30 MIN: CPT

## 2022-08-19 PROCEDURE — 93010 ELECTROCARDIOGRAM REPORT: CPT | Mod: ,,, | Performed by: INTERNAL MEDICINE

## 2022-08-19 PROCEDURE — 93005 ELECTROCARDIOGRAM TRACING: CPT

## 2022-08-19 PROCEDURE — 63600175 PHARM REV CODE 636 W HCPCS: Performed by: NURSE PRACTITIONER

## 2022-08-19 PROCEDURE — 85025 COMPLETE CBC W/AUTO DIFF WBC: CPT | Performed by: PHYSICIAN ASSISTANT

## 2022-08-19 PROCEDURE — 27800903 OPTIME MED/SURG SUP & DEVICES OTHER IMPLANTS: Performed by: ORTHOPAEDIC SURGERY

## 2022-08-19 PROCEDURE — 27236 TREAT THIGH FRACTURE: CPT | Mod: LT,,, | Performed by: ORTHOPAEDIC SURGERY

## 2022-08-19 PROCEDURE — 27236 TREAT THIGH FRACTURE: CPT | Mod: AS,LT,, | Performed by: NURSE PRACTITIONER

## 2022-08-19 PROCEDURE — 37000009 HC ANESTHESIA EA ADD 15 MINS: Performed by: ORTHOPAEDIC SURGERY

## 2022-08-19 PROCEDURE — 36415 COLL VENOUS BLD VENIPUNCTURE: CPT | Performed by: PHYSICIAN ASSISTANT

## 2022-08-19 PROCEDURE — 97166 OT EVAL MOD COMPLEX 45 MIN: CPT

## 2022-08-19 PROCEDURE — 25000003 PHARM REV CODE 250: Performed by: NURSE PRACTITIONER

## 2022-08-19 PROCEDURE — 25000003 PHARM REV CODE 250: Performed by: NURSE ANESTHETIST, CERTIFIED REGISTERED

## 2022-08-19 PROCEDURE — 11000001 HC ACUTE MED/SURG PRIVATE ROOM

## 2022-08-19 PROCEDURE — 80053 COMPREHEN METABOLIC PANEL: CPT | Performed by: PHYSICIAN ASSISTANT

## 2022-08-19 PROCEDURE — 63600175 PHARM REV CODE 636 W HCPCS: Performed by: INTERNAL MEDICINE

## 2022-08-19 PROCEDURE — 80074 ACUTE HEPATITIS PANEL: CPT | Performed by: INTERNAL MEDICINE

## 2022-08-19 PROCEDURE — 71000033 HC RECOVERY, INTIAL HOUR: Performed by: ORTHOPAEDIC SURGERY

## 2022-08-19 PROCEDURE — 86920 COMPATIBILITY TEST SPIN: CPT | Performed by: INTERNAL MEDICINE

## 2022-08-19 PROCEDURE — 25000003 PHARM REV CODE 250: Performed by: ANESTHESIOLOGY

## 2022-08-19 PROCEDURE — 63600175 PHARM REV CODE 636 W HCPCS: Performed by: PHYSICIAN ASSISTANT

## 2022-08-19 PROCEDURE — 99900035 HC TECH TIME PER 15 MIN (STAT)

## 2022-08-19 PROCEDURE — 63600175 PHARM REV CODE 636 W HCPCS: Performed by: ORTHOPAEDIC SURGERY

## 2022-08-19 PROCEDURE — 25000003 PHARM REV CODE 250: Performed by: ORTHOPAEDIC SURGERY

## 2022-08-19 PROCEDURE — 93010 EKG 12-LEAD: ICD-10-PCS | Mod: ,,, | Performed by: INTERNAL MEDICINE

## 2022-08-19 PROCEDURE — 36000710: Performed by: ORTHOPAEDIC SURGERY

## 2022-08-19 PROCEDURE — C1889 IMPLANT/INSERT DEVICE, NOC: HCPCS | Performed by: ORTHOPAEDIC SURGERY

## 2022-08-19 PROCEDURE — 63600175 PHARM REV CODE 636 W HCPCS: Mod: JG

## 2022-08-19 PROCEDURE — 25000003 PHARM REV CODE 250: Performed by: INTERNAL MEDICINE

## 2022-08-19 DEVICE — BIPOLAR COMPONENT
Type: IMPLANTABLE DEVICE | Site: HIP | Status: FUNCTIONAL
Brand: UHR

## 2022-08-19 DEVICE — LFIT V40 FEMORAL HEAD
Type: IMPLANTABLE DEVICE | Site: HIP | Status: FUNCTIONAL
Brand: V40 HEAD

## 2022-08-19 DEVICE — HIP STEM - HIGH OFFSET
Type: IMPLANTABLE DEVICE | Site: HIP | Status: FUNCTIONAL
Brand: INSIGNIA

## 2022-08-19 RX ORDER — FENTANYL CITRATE 50 UG/ML
INJECTION, SOLUTION INTRAMUSCULAR; INTRAVENOUS
Status: DISCONTINUED | OUTPATIENT
Start: 2022-08-19 | End: 2022-08-19

## 2022-08-19 RX ORDER — ACETAMINOPHEN 325 MG/1
650 TABLET ORAL EVERY 6 HOURS PRN
Status: DISCONTINUED | OUTPATIENT
Start: 2022-08-19 | End: 2022-08-23 | Stop reason: HOSPADM

## 2022-08-19 RX ORDER — SODIUM CITRATE AND CITRIC ACID MONOHYDRATE 334; 500 MG/5ML; MG/5ML
30 SOLUTION ORAL ONCE
Status: COMPLETED | OUTPATIENT
Start: 2022-08-19 | End: 2022-08-19

## 2022-08-19 RX ORDER — ONDANSETRON 2 MG/ML
4 INJECTION INTRAMUSCULAR; INTRAVENOUS ONCE
Status: DISCONTINUED | OUTPATIENT
Start: 2022-08-19 | End: 2022-08-23 | Stop reason: HOSPADM

## 2022-08-19 RX ORDER — DIPHENHYDRAMINE HYDROCHLORIDE 50 MG/ML
25 INJECTION INTRAMUSCULAR; INTRAVENOUS ONCE
Status: DISCONTINUED | OUTPATIENT
Start: 2022-08-19 | End: 2022-08-19

## 2022-08-19 RX ORDER — CEFAZOLIN SODIUM 2 G/50ML
2 SOLUTION INTRAVENOUS
Status: DISCONTINUED | OUTPATIENT
Start: 2022-08-19 | End: 2022-08-19

## 2022-08-19 RX ORDER — SODIUM CHLORIDE 0.9 % (FLUSH) 0.9 %
SYRINGE (ML) INJECTION
Status: DISCONTINUED | OUTPATIENT
Start: 2022-08-19 | End: 2022-08-19 | Stop reason: HOSPADM

## 2022-08-19 RX ORDER — ETOMIDATE 2 MG/ML
INJECTION INTRAVENOUS
Status: DISCONTINUED | OUTPATIENT
Start: 2022-08-19 | End: 2022-08-19

## 2022-08-19 RX ORDER — KETOROLAC TROMETHAMINE 30 MG/ML
INJECTION, SOLUTION INTRAMUSCULAR; INTRAVENOUS
Status: DISCONTINUED | OUTPATIENT
Start: 2022-08-19 | End: 2022-08-19 | Stop reason: HOSPADM

## 2022-08-19 RX ORDER — ALBUMIN HUMAN 50 G/1000ML
SOLUTION INTRAVENOUS
Status: COMPLETED
Start: 2022-08-19 | End: 2022-08-19

## 2022-08-19 RX ORDER — MORPHINE SULFATE 10 MG/ML
INJECTION INTRAMUSCULAR; INTRAVENOUS; SUBCUTANEOUS
Status: DISCONTINUED | OUTPATIENT
Start: 2022-08-19 | End: 2022-08-19 | Stop reason: HOSPADM

## 2022-08-19 RX ORDER — MEPERIDINE HYDROCHLORIDE 25 MG/ML
12.5 INJECTION INTRAMUSCULAR; INTRAVENOUS; SUBCUTANEOUS ONCE
Status: DISCONTINUED | OUTPATIENT
Start: 2022-08-19 | End: 2022-08-19

## 2022-08-19 RX ORDER — CEFAZOLIN SODIUM 1 G/3ML
INJECTION, POWDER, FOR SOLUTION INTRAMUSCULAR; INTRAVENOUS
Status: DISCONTINUED | OUTPATIENT
Start: 2022-08-19 | End: 2022-08-19

## 2022-08-19 RX ORDER — ROCURONIUM BROMIDE 10 MG/ML
INJECTION, SOLUTION INTRAVENOUS
Status: DISCONTINUED | OUTPATIENT
Start: 2022-08-19 | End: 2022-08-19

## 2022-08-19 RX ORDER — PHENYLEPHRINE HYDROCHLORIDE 10 MG/ML
INJECTION INTRAVENOUS
Status: DISCONTINUED | OUTPATIENT
Start: 2022-08-19 | End: 2022-08-19

## 2022-08-19 RX ORDER — SODIUM CITRATE AND CITRIC ACID MONOHYDRATE 334; 500 MG/5ML; MG/5ML
SOLUTION ORAL
Status: DISPENSED
Start: 2022-08-19 | End: 2022-08-19

## 2022-08-19 RX ORDER — SODIUM CHLORIDE, SODIUM GLUCONATE, SODIUM ACETATE, POTASSIUM CHLORIDE AND MAGNESIUM CHLORIDE 30; 37; 368; 526; 502 MG/100ML; MG/100ML; MG/100ML; MG/100ML; MG/100ML
1000 INJECTION, SOLUTION INTRAVENOUS CONTINUOUS
Status: DISCONTINUED | OUTPATIENT
Start: 2022-08-19 | End: 2022-08-19

## 2022-08-19 RX ORDER — ALBUMIN HUMAN 50 G/1000ML
12.5 SOLUTION INTRAVENOUS ONCE
Status: COMPLETED | OUTPATIENT
Start: 2022-08-19 | End: 2022-08-19

## 2022-08-19 RX ORDER — ACETAMINOPHEN 10 MG/ML
1000 INJECTION, SOLUTION INTRAVENOUS EVERY 8 HOURS
Status: DISPENSED | OUTPATIENT
Start: 2022-08-19 | End: 2022-08-20

## 2022-08-19 RX ORDER — ONDANSETRON 4 MG/1
4 TABLET, ORALLY DISINTEGRATING ORAL ONCE
Status: CANCELLED | OUTPATIENT
Start: 2022-08-19 | End: 2022-08-19

## 2022-08-19 RX ORDER — ONDANSETRON 2 MG/ML
INJECTION INTRAMUSCULAR; INTRAVENOUS
Status: COMPLETED
Start: 2022-08-19 | End: 2022-08-19

## 2022-08-19 RX ORDER — ONDANSETRON 2 MG/ML
INJECTION INTRAMUSCULAR; INTRAVENOUS
Status: DISCONTINUED | OUTPATIENT
Start: 2022-08-19 | End: 2022-08-19

## 2022-08-19 RX ORDER — DEXAMETHASONE SODIUM PHOSPHATE 4 MG/ML
INJECTION, SOLUTION INTRA-ARTICULAR; INTRALESIONAL; INTRAMUSCULAR; INTRAVENOUS; SOFT TISSUE
Status: DISCONTINUED | OUTPATIENT
Start: 2022-08-19 | End: 2022-08-19

## 2022-08-19 RX ORDER — MIDAZOLAM HYDROCHLORIDE 1 MG/ML
2 INJECTION INTRAMUSCULAR; INTRAVENOUS ONCE AS NEEDED
Status: CANCELLED | OUTPATIENT
Start: 2022-08-19 | End: 2034-01-15

## 2022-08-19 RX ORDER — METHOCARBAMOL 500 MG/1
500 TABLET, FILM COATED ORAL EVERY 8 HOURS PRN
Status: DISCONTINUED | OUTPATIENT
Start: 2022-08-19 | End: 2022-08-23 | Stop reason: HOSPADM

## 2022-08-19 RX ORDER — MIDAZOLAM HYDROCHLORIDE 1 MG/ML
INJECTION INTRAMUSCULAR; INTRAVENOUS
Status: DISCONTINUED | OUTPATIENT
Start: 2022-08-19 | End: 2022-08-19

## 2022-08-19 RX ORDER — SODIUM CHLORIDE, SODIUM GLUCONATE, SODIUM ACETATE, POTASSIUM CHLORIDE AND MAGNESIUM CHLORIDE 30; 37; 368; 526; 502 MG/100ML; MG/100ML; MG/100ML; MG/100ML; MG/100ML
1000 INJECTION, SOLUTION INTRAVENOUS CONTINUOUS
Status: CANCELLED | OUTPATIENT
Start: 2022-08-19 | End: 2022-09-18

## 2022-08-19 RX ORDER — PROPOFOL 10 MG/ML
VIAL (ML) INTRAVENOUS
Status: DISCONTINUED | OUTPATIENT
Start: 2022-08-19 | End: 2022-08-19

## 2022-08-19 RX ORDER — LIDOCAINE HYDROCHLORIDE 10 MG/ML
1 INJECTION, SOLUTION EPIDURAL; INFILTRATION; INTRACAUDAL; PERINEURAL ONCE
Status: CANCELLED | OUTPATIENT
Start: 2022-08-19 | End: 2022-08-19

## 2022-08-19 RX ORDER — ROPIVACAINE HYDROCHLORIDE 5 MG/ML
INJECTION, SOLUTION EPIDURAL; INFILTRATION; PERINEURAL
Status: DISCONTINUED | OUTPATIENT
Start: 2022-08-19 | End: 2022-08-19 | Stop reason: HOSPADM

## 2022-08-19 RX ORDER — EPINEPHRINE 1 MG/ML
INJECTION, SOLUTION INTRACARDIAC; INTRAMUSCULAR; INTRAVENOUS; SUBCUTANEOUS
Status: DISCONTINUED | OUTPATIENT
Start: 2022-08-19 | End: 2022-08-19 | Stop reason: HOSPADM

## 2022-08-19 RX ORDER — ACETAMINOPHEN 325 MG/1
650 TABLET ORAL EVERY 4 HOURS PRN
Status: CANCELLED | OUTPATIENT
Start: 2022-08-19

## 2022-08-19 RX ORDER — EPHEDRINE SULFATE 50 MG/ML
INJECTION, SOLUTION INTRAVENOUS
Status: DISCONTINUED | OUTPATIENT
Start: 2022-08-19 | End: 2022-08-19

## 2022-08-19 RX ADMIN — CEFTRIAXONE SODIUM 1 G: 1 INJECTION, POWDER, FOR SOLUTION INTRAMUSCULAR; INTRAVENOUS at 01:08

## 2022-08-19 RX ADMIN — GABAPENTIN 300 MG: 300 CAPSULE ORAL at 08:08

## 2022-08-19 RX ADMIN — DOCUSATE SODIUM 100 MG: 100 CAPSULE, LIQUID FILLED ORAL at 08:08

## 2022-08-19 RX ADMIN — PROPOFOL 20 MG: 10 INJECTION, EMULSION INTRAVENOUS at 09:08

## 2022-08-19 RX ADMIN — MORPHINE SULFATE 2 MG: 4 INJECTION INTRAVENOUS at 03:08

## 2022-08-19 RX ADMIN — ALBUMIN (HUMAN) 12.5 G: 2.5 SOLUTION INTRAVENOUS at 01:08

## 2022-08-19 RX ADMIN — PHENYLEPHRINE HYDROCHLORIDE 100 MCG: 10 INJECTION INTRAVENOUS at 10:08

## 2022-08-19 RX ADMIN — BACLOFEN 10 MG: 10 TABLET ORAL at 08:08

## 2022-08-19 RX ADMIN — PANTOPRAZOLE SODIUM 40 MG: 40 TABLET, DELAYED RELEASE ORAL at 08:08

## 2022-08-19 RX ADMIN — MIDAZOLAM 2 MG: 1 INJECTION INTRAMUSCULAR; INTRAVENOUS at 09:08

## 2022-08-19 RX ADMIN — MEMANTINE 5 MG: 5 TABLET ORAL at 08:08

## 2022-08-19 RX ADMIN — EPHEDRINE SULFATE 10 MG: 50 INJECTION INTRAVENOUS at 11:08

## 2022-08-19 RX ADMIN — ETOMIDATE 14 MG: 2 INJECTION INTRAVENOUS at 09:08

## 2022-08-19 RX ADMIN — MIDODRINE HYDROCHLORIDE 5 MG: 5 TABLET ORAL at 04:08

## 2022-08-19 RX ADMIN — CEFAZOLIN 2 G: 330 INJECTION, POWDER, FOR SOLUTION INTRAMUSCULAR; INTRAVENOUS at 10:08

## 2022-08-19 RX ADMIN — BACLOFEN 10 MG: 10 TABLET ORAL at 04:08

## 2022-08-19 RX ADMIN — DONEPEZIL HYDROCHLORIDE 5 MG: 5 TABLET, FILM COATED ORAL at 08:08

## 2022-08-19 RX ADMIN — MIDODRINE HYDROCHLORIDE 5 MG: 5 TABLET ORAL at 08:08

## 2022-08-19 RX ADMIN — AMLODIPINE BESYLATE 5 MG: 5 TABLET ORAL at 08:08

## 2022-08-19 RX ADMIN — SUGAMMADEX 154 MG: 100 INJECTION, SOLUTION INTRAVENOUS at 11:08

## 2022-08-19 RX ADMIN — SODIUM CITRATE AND CITRIC ACID MONOHYDRATE 30 ML: 500; 334 SOLUTION ORAL at 09:08

## 2022-08-19 RX ADMIN — SODIUM CHLORIDE, SODIUM GLUCONATE, SODIUM ACETATE, POTASSIUM CHLORIDE AND MAGNESIUM CHLORIDE: 526; 502; 368; 37; 30 INJECTION, SOLUTION INTRAVENOUS at 09:08

## 2022-08-19 RX ADMIN — ACETAMINOPHEN 650 MG: 325 TABLET ORAL at 08:08

## 2022-08-19 RX ADMIN — ACETAMINOPHEN 1000 MG: 10 INJECTION, SOLUTION INTRAVENOUS at 10:08

## 2022-08-19 RX ADMIN — ROCURONIUM BROMIDE 50 MG: 10 INJECTION, SOLUTION INTRAVENOUS at 09:08

## 2022-08-19 RX ADMIN — CEFTRIAXONE SODIUM 1 G: 1 INJECTION, POWDER, FOR SOLUTION INTRAMUSCULAR; INTRAVENOUS at 11:08

## 2022-08-19 RX ADMIN — ONDANSETRON 4 MG: 2 INJECTION INTRAMUSCULAR; INTRAVENOUS at 10:08

## 2022-08-19 RX ADMIN — ONDANSETRON 4 MG: 2 INJECTION INTRAMUSCULAR; INTRAVENOUS at 09:08

## 2022-08-19 RX ADMIN — ENOXAPARIN SODIUM 40 MG: 40 INJECTION SUBCUTANEOUS at 04:08

## 2022-08-19 RX ADMIN — FENTANYL CITRATE 50 MCG: 50 INJECTION, SOLUTION INTRAMUSCULAR; INTRAVENOUS at 11:08

## 2022-08-19 RX ADMIN — TRAZODONE HYDROCHLORIDE 100 MG: 100 TABLET ORAL at 08:08

## 2022-08-19 RX ADMIN — MORPHINE SULFATE 2 MG: 4 INJECTION INTRAVENOUS at 08:08

## 2022-08-19 RX ADMIN — DEXAMETHASONE SODIUM PHOSPHATE 4 MG: 4 INJECTION, SOLUTION INTRA-ARTICULAR; INTRALESIONAL; INTRAMUSCULAR; INTRAVENOUS; SOFT TISSUE at 10:08

## 2022-08-19 RX ADMIN — ALBUMIN HUMAN 12.5 G: 50 SOLUTION INTRAVENOUS at 01:08

## 2022-08-19 NOTE — PROGRESS NOTES
[facility]  Hospital Medicine Progress Note        Chief Complaint: Inpatient Follow-up for left hip fracture due to fall at home     HPI: 72-year-old female with known past medical history of hypertension, hyperlipidemia, HIV positive on cabenuva with HIV viral load undetectable, dementia, admitted 08/18/2022 after cleaning up for on 08/17/2022 at her home falling on her left hip, patient reports he did not hit her head.  Complained of pain in the left hip as well.  Patient reports she was able to get up from the fall but was unable to bear weight on that leg.   Patient and her sister lives together, sister is at bedside informed that there just recovering from COVID infection 2 weeks ago, did not needed to be admitted to the hospital, was treated outpatient with antibiotic by their PCP.  They are both vaccinated and boosted.  Patient still complains of nonproductive cough.  Denies chest pain shortness of breath fever abdominal pain nausea vomiting diarrhea or constipation.  No urinary symptoms as well.  Patient reports at home she usually walks with a walker and does not need assistance with activities of daily living   In the ER patient was noted to have lower oxygen saturation 91% so she was started on supplemental oxygen via nasal cannula.  Mild leukocytosis of 13.4, liver enzymes were normal yesterday but this morning it has significantly increased.  COVID rapid was negative.  X-ray chest without acute pulmonary processes but prominent interstitial marking without focal opacification.  Hip x-ray confirmed left hip fracture.   Discussed orthopedic has been consulted, will keep her NPO till the evaluate for probable surgery today.  Verbalized understanding     Interval Hx:   Had a quick visit this morning with the patient as she was rolling out for surgery this morning  Pain is controlled  Noted fever spike of 101.5 this morning, ordered blood cultures, since then afebrile  Case was  discussed with patient nurse on the floor    Objective/physical exam:  General:  Laying in bed  Chest: Clear to auscultation bilaterally anteriorly   Heart: S1, S2, no appreciable murmur   Abdomen: Soft, nontender, BS +   MSK:  Left leg externally rotated, no edema   Neurologic: Alert and oriented x4, cranial nerves 2-12 grossly intact.  Able to answer all the questions appropriately     VITAL SIGNS: 24 HRS MIN & MAX LAST   Temp  Min: 98.8 °F (37.1 °C)  Max: 101.4 °F (38.6 °C) 99.5 °F (37.5 °C)   BP  Min: 110/62  Max: 144/72 139/71   Pulse  Min: 71  Max: 87  83   Resp  Min: 17  Max: 18 17   SpO2  Min: 91 %  Max: 96 % (!) 91 %       Recent Labs   Lab 08/17/22 2049 08/18/22  0459 08/19/22  0540   WBC 13.4* 9.5 10.5   RBC 3.56* 4.32 3.31*   HGB 10.2* 12.5 9.7*   HCT 32.9* 42.1 30.9*   MCV 92.4 97.5* 93.4   MCH 28.7 28.9 29.3   MCHC 31.0* 29.7* 31.4*   RDW 14.6 14.6 14.5    203 158   MPV 9.2 9.5 9.7       Recent Labs   Lab 08/18/22 0459 08/18/22  1114 08/19/22  0540    142 136   K 4.9 4.3 3.5   CO2 17* 28 27   BUN 18.6 15.3 12.7   CREATININE 1.41* 0.98 0.77   CALCIUM 9.1 8.9 8.2*   MG 2.00  --   --    ALBUMIN 3.2* 2.9* 2.4*   ALKPHOS 161* 156* 120   * 166* 90*   * 435* 131*   BILITOT 0.9 0.6 0.5          Microbiology Results (last 7 days)     ** No results found for the last 168 hours. **           See below for Radiology    Scheduled Med:   amLODIPine  5 mg Oral Daily    azithromycin (ZITHROMAX) 500 mg IVPB  500 mg Intravenous Q24H    baclofen  10 mg Oral TID    cefTRIAXone (ROCEPHIN) IVPB  1 g Intravenous Q24H    docusate sodium  100 mg Oral QHS    donepeziL  5 mg Oral QHS    enoxaparin  40 mg Subcutaneous Daily    gabapentin  300 mg Oral BID    memantine  5 mg Oral BID    midodrine  5 mg Oral BID WM    oxybutynin  5 mg Oral Daily    pantoprazole  40 mg Oral Daily    traZODone  100 mg Oral QHS    vortioxetine  10 mg Oral Daily        Continuous Infusions:   sodium chloride  0.9% 75 mL/hr at 08/18/22 1833        PRN Meds:  hydrALAZINE, melatonin, morphine, naloxone, ondansetron, sodium chloride 0.9%       Assessment/Plan:  Left hip fracture secondary to ground level fall at home    ? Community-acquired pneumonia vs residual from recent covid infection   Acute kidney injury- resolved    Transaminitis - tended down   Essential hypertension   HIV positive on Cabenuva- viral load undetected           Admitted as inpatient on 8/18   Ortho consulted appreciate recommendations.  Going to OR today 8/19  NPO since MN  IV Morphine for pain, noted pt allergic to Codeine, gives her palpitations and chest heaviness    Continue with Rocephin and azithromycin empirically- day 2 of 5  Noted fever spike of 101.5, order blood cultures x2, the only 1 culture was collected  Right upper quadrant ultrasound ordered for elevated liver enzymes --> Heterogeneous echotexture of the liver may be related to steatosis, hepatocellular disease or infiltrative lesions.  This is poorly characterized on the current exam due to poor acoustic window   Acute hep panel --> nonreactive  Liver enzymes trending down  Cont IV fluid hydration while NPO   IV hydralazine as needed for hypertension   Resumed appropriate home medication for chronic medical condition  Hold antihypertensive medication it is if systolic blood pressure less than 110  Continue midodrine  I will hold ASA preop and Statin deliver function normalize  PT OT consulted postop  Morning cbc , cmp ordered        VTE Prophylaxis: will be placed on SCD for DVT prophylaxis and will be advised to be as mobile as possible and sit in a chair as tolerated       Patient condition:  Stable        Discharge Planning and Disposition/Anticipated discharge: TBD - probably Rehab vs SNF      All diagnosis and differential diagnosis have been reviewed; assessment and plan has been documented; I have personally reviewed the labs and test results that are presently available; I  have reviewed the patients medication list; I have reviewed the consulting providers response and recommendations. I have reviewed or attempted to review medical records based upon their availability    All of the patient's questions have been  addressed and answered. Patient's is agreeable to the above stated plan. I will continue to monitor closely and make adjustments to medical management as needed.  _____________________________________________________________________    Nutrition Status:    Radiology:  US Abdomen Limited  Narrative: EXAMINATION:  US ABDOMEN LIMITED    CLINICAL HISTORY:  Transaminitis;    TECHNIQUE:  Limited ultrasound of the right upper quadrant of the abdomen was performed.    COMPARISON:  None    FINDINGS:  LIMITATIONS: Exam limited due to poor acoustic window related to shadowing bowel gas and/or body habitus.    LIVER: 15 cm cranial caudal at the midclavicular line. Heterogeneous echotexture.  Portal vein is patent with appropriate directional flow.    PANCREAS: Obscured by overlying bowel gas.    GALLBLADDER: The gallbladder is surgically absent.    BILE DUCTS: 9 mm common bile duct.  Distal common bile duct is obscured by shadowing bowel gas.    INFERIOR VENA CAVA: Obscured by shadowing    RIGHT KIDNEY: Suboptimally visualized.  Length estimated at 9 cm. No hydronephrosis.    OTHER: No ascites.  Aorta is obscured by shadowing bowel gas.  Impression: Technically limited exam due to acoustic shadowing.    The gallbladder is not seen and is reportedly surgically absent.  There is age indeterminate ectasia of the common bile duct.    Heterogeneous echotexture of the liver may be related to steatosis, hepatocellular disease or infiltrative lesions.  This is poorly characterized on the current exam due to poor acoustic window.    Electronically signed by: Aga Carrero  Date:    08/18/2022  Time:    10:51  X-Ray Hip 2 or 3 views Left (with Pelvis when performed)  Narrative:  EXAMINATION:  COMPLETE LEFT HIP AND PELVIS X-RAYS:    CPT: 91772, 76668    CLINICAL HISTORY:  hip pain;    FINDINGS:  Displaced subcapital fracture of the left hip with no other definite fractures or dislocations identified  Impression: Fracture as above    Electronically signed by: Emiliano Esteban  Date:    08/18/2022  Time:    08:47  X-Ray Chest AP Portable  Narrative: EXAMINATION:  XR CHEST AP PORTABLE    CLINICAL HISTORY:  shortness of breath;    TECHNIQUE:  Single view of the chest    COMPARISON:  No prior imaging available for comparison.    FINDINGS:  No focal opacification, pleural effusion, or pneumothorax.    The cardiomediastinal silhouette is within normal limits.    No acute osseous abnormality.  Impression: No acute cardiopulmonary process.  Prominent interstitial markings with no focal opacification.    Electronically signed by: Javier Lerner  Date:    08/18/2022  Time:    07:08      Juan Jose Medina MD   08/19/2022

## 2022-08-19 NOTE — PT/OT/SLP EVAL
Occupational Therapy   Evaluation    Name: Susy Foy  MRN: [id number]  Admitting Diagnosis:  Left displaced femoral neck fracture  Recent Surgery: Procedure(s) (LRB):  HEMIARTHROPLASTY, HIP (Left) Day of Surgery    Recommendations:     Discharge Recommendations: home with home health, rehabilitation facility (Home c responsible caregiver)  Discharge Equipment Recommendations:  other (see comments) (TBD pending progress c therapy)  Barriers to discharge:  Other (Comment) (Severity of deficits)    Assessment:     Susy Foy is a 72 y.o. female with a medical diagnosis of Left displaced femoral neck fracture. Performance deficits affecting function: impaired functional mobility, impaired endurance, pain, impaired balance, impaired self care skills.      Rehab Prognosis: Good; patient would benefit from acute skilled OT services to address these deficits and reach maximum level of function.       Plan:     Patient to be seen 5 x/week, daily to address the above listed problems via self-care/home management, therapeutic activities, therapeutic exercises  · Plan of Care Expires: 09/02/22  · Plan of Care Reviewed with: patient, sibling    Subjective     Chief Complaint: Pain in L hip   Patient/Family Comments/goals: To return to PLOF     Occupational Profile:  Living Environment: Pt lives in a H c a ramp to enter c her sister. Has a walk in shower c a shower chair.   Previous level of function: IND c ADLs and mobility using a RW  Equipment Used at Home:  shower chair, walker, rolling  Assistance upon Discharge: Pt's sister reported she will be able to assist at d/c.     Pain/Comfort:  · Pain Rating 1: 5/10  · Location - Side 1: Left  · Location 1: leg  · Pain Addressed 1: Reposition    Patients cultural, spiritual, Worship conflicts given the current situation: no    Objective:     Communicated with: RN prior to session.  Patient found supine with blood pressure cuff, telemetry, pulse ox (continuous),  oxygen, hip abduction pillow upon OT entry to room.    General Precautions: Standard, fall   Orthopedic Precautions:RLE weight bearing as tolerated (Full ROM)   Braces: N/A  Respiratory Status: Nasal cannula, flow 4 L/min  BP: 95/51. Pt reported BP typically runs low.   O2 sats: 95, 92 c ax  Occupational Performance:    Bed Mobility:    · Patient completed Scooting/Bridging with minimum assistance  · Patient completed Supine to Sit with moderate assistance  · Patient completed Sit to Supine with minimum assistance    Functional Mobility/Transfers:  · Patient completed Sit <> Stand Transfer with minimum assistance  with  rolling walker   · Functional Mobility: Pt able to take 4 steps forward/back using RW c Min A.     Activities of Daily Living:  · Lower Body Dressing: maximal assistance socks    Cognitive/Visual Perceptual:  Cognitive/Psychosocial Skills:     -       Mood/Affect/Coping skills/emotional control: Appropriate to situation and Cooperative    Physical Exam:  Upper Extremity Strength:    -       Right Upper Extremity: WFL  -       Left Upper Extremity: WFL    Treatment & Education:  Pt educated pt on OT POC and orthopedic pxns. Pt verbalized understanding.   Education:    Patient left HOB elevated with all lines intact and call button in reach    GOALS:   Multidisciplinary Problems     Occupational Therapy Goals        Problem: Occupational Therapy    Goal Priority Disciplines Outcome Interventions   Occupational Therapy Goal     OT, PT/OT Ongoing, Progressing    Description: Goals to be met by:9/1    Patient will increase functional independence with ADLs by performing:    LE Dressing with Stand-by Assistance.  Grooming while standing at sink with Stand-by Assistance.  Toileting from toilet with Stand-by Assistance for hygiene and clothing management.   Toilet transfer to toilet with Stand-by Assistance.                     History:     Past Medical History:   Diagnosis Date    Depression      Hypertension     Mixed hyperlipidemia     Stress incontinence of urine        History reviewed. No pertinent surgical history.    Time Tracking:     OT Date of Treatment: 08/19/22  OT Start Time:    OT Stop Time:    OT Total Time (min):      Billable Minutes:Evaluation Moderate complexity     8/19/2022

## 2022-08-19 NOTE — PLAN OF CARE
Problem: Occupational Therapy  Goal: Occupational Therapy Goal  Description: Goals to be met by:9/1    Patient will increase functional independence with ADLs by performing:    LE Dressing with Stand-by Assistance.  Grooming while standing at sink with Stand-by Assistance.  Toileting from toilet with Stand-by Assistance for hygiene and clothing management.   Toilet transfer to toilet with Stand-by Assistance.    Outcome: Ongoing, Progressing

## 2022-08-19 NOTE — ANESTHESIA PREPROCEDURE EVALUATION
: HEMIARTHROPLASTY, HIP (Left )   Anesthesia type: General   Diagnosis: Left displaced femoral neck fracture [S72.002A]   Pre-op diagnosis: Left displaced femoral neck fracture [S72.002A]   Location: [facility] OR 03 / [facility] OR                                                                                                          08/19/2022  Susy Foy is a 72 y.o., female. HEMIARTHROPLASTY, HIP (Left )  Holding, [facility] OR 03    Pre-op Assessment    I have reviewed the Patient Summary Reports.     I have reviewed the Nursing Notes. I have reviewed the NPO Status.   I have reviewed the Medications.     Review of Systems  Anesthesia Hx:  No problems with previous Anesthesia    Hematology/Oncology:  Hematology Normal   Oncology Normal     EENT/Dental:EENT/Dental Normal   Cardiovascular:   Hypertension Denies CAD.     Denies Angina.  Denies ENRIQUE.  Functional Capacity unable to determine    Pulmonary:  Pulmonary Normal    Renal/:   Denies Chronic Renal Disease.     Hepatic/GI:  Hepatic/GI Normal    Musculoskeletal:  Musculoskeletal Normal    Neurological:  Neurology Normal    Endocrine:  Endocrine Normal  Denies Morbid Obesity / BMI > 40  Dermatological:  Skin Normal    Psych:   Psychiatric History          Physical Exam  General: Alert, Oriented, Well nourished and Cooperative    Airway:  Mallampati: IV   Mouth Opening: < 3 cm  TM Distance: < 4 cm  Tongue: Normal  Neck ROM: Normal ROM    Dental:  Intact, Dentures    Chest/Lungs:  Clear to auscultation, Normal Respiratory Rate    Heart:  Rate: Normal  Rhythm: Regular Rhythm    Musculoskeletal:See PI      Anesthesia Plan  Type of Anesthesia, risks & benefits discussed:    Anesthesia Type: Gen ETT  Intra-op Monitoring Plan: Standard ASA Monitors  Post Op Pain Control Plan: multimodal analgesia  Induction:  IV, Inhalation and rapid sequence  Airway Plan: Direct  Informed Consent: Informed consent signed with the Patient and all parties understand the risks and  agree with anesthesia plan.  All questions answered. Patient consented to blood products? Yes  ASA Score: 3  Day of Surgery Review of History & Physical: H&P Update referred to the surgeon/provider.  Anesthesia Plan Notes: Baseline ekg  bicitra 30 cc po , pt nauseated and vomiting  Zofran 4 mg iv  GLIDESCOPE radha    Ready For Surgery From Anesthesia Perspective.      Latest Reference Range & Units 08/19/22 05:40   WBC 4.5 - 11.5 x10(3)/mcL 10.5   RBC 4.20 - 5.40 x10(6)/mcL 3.31 (L)   Hemoglobin 12.0 - 16.0 gm/dL 9.7 (L)   Hematocrit 37.0 - 47.0 % 30.9 (L)   MCV 80.0 - 94.0 fL 93.4   MCH 27.0 - 31.0 pg 29.3   MCHC 33.0 - 36.0 mg/dL 31.4 (L)   RDW 11.5 - 17.0 % 14.5   Platelets 130 - 400 x10(3)/mcL 158   MPV 7.4 - 10.4 fL 9.7   Neut % % 70.9   LYMPH % % 12.5   Mono % % 5.8   Eosinophil % % 9.6   Basophil % % 0.7   Immature Granulocytes % 0.5   Neut # 2.1 - 9.2 x10(3)/mcL 7.4   Lymph # 0.6 - 4.6 x10(3)/mcL 1.31   Mono # 0.1 - 1.3 x10(3)/mcL 0.61   Eos # 0 - 0.9 x10(3)/mcL 1.01 (H)   Baso # 0 - 0.2 x10(3)/mcL 0.07   Immature Grans (Abs) 0 - 0.04 x10(3)/mcL 0.05 (H)   nRBC % 0.0   Sodium 136 - 145 mmol/L 136   Potassium 3.5 - 5.1 mmol/L 3.5   Chloride 98 - 107 mmol/L 102   CO2 23 - 31 mmol/L 27   BUN 9.8 - 20.1 mg/dL 12.7   Creatinine 0.55 - 1.02 mg/dL 0.77   eGFR mls/min/1.73/m2 >60   Glucose 82 - 115 mg/dL 86   Calcium 8.4 - 10.2 mg/dL 8.2 (L)   Alkaline Phosphatase 40 - 150 unit/L 120   PROTEIN TOTAL 5.8 - 7.6 gm/dL 5.3 (L)   Albumin 3.4 - 4.8 gm/dL 2.4 (L)   (L): Data is abnormally low  (H): Data is abnormally high  .     FINDINGS:  No focal opacification, pleural effusion, or pneumothorax.     The cardiomediastinal silhouette is within normal limits.     No acute osseous abnormality.     Impression:     No acute cardiopulmonary process.  Prominent interstitial markings with no focal opacification.        Electronically signed by: Javier Lerner

## 2022-08-19 NOTE — PROGRESS NOTES
"Patient Name: Susy Foy  MRN: [id number]  Admission Date: 8/17/2022  Hospital Length of Stay: 2 days  Attending Provider: Juan Jose Medina MD  Primary Care Provider: Primary Doctor No  Follow-up For: Procedure(s) (LRB):  HEMIARTHROPLASTY, HIP (Left)    Post-Operative Day: Day of Surgery  Subjective:       Principal Orthopedic Problem: Day of Surgery       Interval History:  No acute issues overnight.  Pain in hip controlled with medication and rest.  Ready for surgery today.  I have agreed to assume care of her femoral neck fracture due to my earlier operative availability from Dr. Patric Ac.    Review of patient's allergies indicates:   Allergen Reactions    Codeine        Current Facility-Administered Medications   Medication    0.9%  NaCl infusion    acetaminophen tablet 650 mg    amLODIPine tablet 5 mg    azithromycin (ZITHROMAX) 500 mg in dextrose 5 % 250 mL IVPB    baclofen tablet 10 mg    cefTRIAXone (ROCEPHIN) 1 g in dextrose 5 % in water (D5W) 5 % 50 mL IVPB (MB+)    docusate sodium capsule 100 mg    donepeziL tablet 5 mg    enoxaparin injection 40 mg    gabapentin capsule 300 mg    hydrALAZINE injection 10 mg    melatonin tablet 6 mg    memantine tablet 5 mg    midodrine tablet 5 mg    morphine injection 2 mg    naloxone 0.4 mg/mL injection 0.02 mg    ondansetron injection 4 mg    oxybutynin tablet 5 mg    pantoprazole EC tablet 40 mg    sodium chloride 0.9% flush 10 mL    traZODone tablet 100 mg    vortioxetine Tab 10 mg     Objective:     Vital Signs (Most Recent):  Temp: (!) 101.5 °F (38.6 °C) (08/19/22 0806)  Pulse: 81 (08/19/22 0734)  Resp: 18 (08/19/22 0807)  BP: (!) 131/59 (08/19/22 0856)  SpO2: (!) 93 % (08/19/22 0734) Vital Signs (24h Range):  Temp:  [99.5 °F (37.5 °C)-101.5 °F (38.6 °C)] 101.5 °F (38.6 °C)  Pulse:  [76-87] 81  Resp:  [17-18] 18  SpO2:  [91 %-94 %] 93 %  BP: (110-144)/(59-78) 131/59     Weight: 77.1 kg (169 lb 15.6 oz)  Height: 5' 5" (165.1 cm)  Body " mass index is 28.29 kg/m².      Intake/Output Summary (Last 24 hours) at 8/19/2022 0928  Last data filed at 8/19/2022 0800  Gross per 24 hour   Intake 240 ml   Output 900 ml   Net -660 ml       Physical Exam:   Ortho/SPM Exam    General the patient is alert and oriented x3 no acute distress nontoxic-appearing appropriate affect.    Constitutional: Vital signs are examined and stable.  Resp: No signs of labored breathing    Musculoskeletal Exam:  Left lower extremity:  Thigh soft and compressible.  Site marked.  No attempted range of motion of the hip performed.  She has 2+ DP pulse, 5/5 motor strength dorsiflexion plantar flexion of the ankle and digits.    Diagnostic Findings:   Significant Labs: All pertinent labs within the past 24 hours have been reviewed.  Recent Lab Results       08/19/22  0540   08/18/22  1114        Albumin/Globulin Ratio 0.8   0.9       Albumin 2.4   2.9       Alkaline Phosphatase 120   156       ALT 90   166          435       Baso # 0.07         Basophil % 0.7         BILIRUBIN TOTAL 0.5   0.6       BUN 12.7   15.3       Calcium 8.2   8.9       Chloride 102   105       CO2 27   28       Creatinine 0.77   0.98       eGFR >60   >60       Eos # 1.01         Eosinophil % 9.6         Globulin, Total 2.9   3.2       Glucose 86   91       Hematocrit 30.9         Hemoglobin 9.7         Immature Grans (Abs) 0.05         Immature Granulocytes 0.5         Lymph # 1.31         LYMPH % 12.5         MCH 29.3         MCHC 31.4         MCV 93.4         Mono # 0.61         Mono % 5.8         MPV 9.7         Neut # 7.4         Neut % 70.9         nRBC 0.0         Platelets 158         Potassium 3.5   4.3       PROTEIN TOTAL 5.3   6.1       RBC 3.31         RDW 14.5         Sodium 136   142       WBC 10.5                Significant Imaging: I have reviewed and interpreted all pertinent imaging results/findings.     Assessment/Plan:     Active Diagnoses:    Diagnosis Date Noted POA    PRINCIPAL  PROBLEM:  Left displaced femoral neck fracture [S72.002A] 08/18/2022 Yes      Problems Resolved During this Admission:     The risks, benefits and alternatives treatment were discussed at length with the patient today including but not limited to pain, bleeding, scarring, infection, damage to neurovascular structures, malunion/nonunion, hardware failure/irritation, need for future procedures and complications leading to amputation and even death. Plan for L hip hemiarthroplasty today. She is happy with this plan of care and all questions and concerns were addressed.        Florentin Gomez MD  Orthopedic Trauma Surgery  [facility]

## 2022-08-19 NOTE — PT/OT/SLP EVAL
Physical Therapy Evaluation    Patient Name:  Susy Foy   MRN:  [id number]    Recommendations:     Discharge Recommendations:  home health PT, outpatient PT   Discharge Equipment Recommendations: none   Barriers to discharge: medical diagnosis; decreased functional mobility/independence    Assessment:     Susy Foy is a 72 y.o. female admitted with a medical diagnosis of Left displaced femoral neck fracture s/p hemiarthroplasty.  She presents with the following impairments/functional limitations:  weakness, gait instability, impaired endurance, impaired balance, decreased lower extremity function, impaired functional mobility, pain, orthopedic precautions.    Rehab Prognosis: Good; patient would benefit from acute skilled PT services to address these deficits and reach maximum level of function.    Recent Surgery: Procedure(s) (LRB):  HEMIARTHROPLASTY, HIP (Left) Day of Surgery    Plan:     During this hospitalization, patient to be seen BID to address the identified rehab impairments via gait training, therapeutic activities, therapeutic exercises and progress toward the following goals:    · Plan of Care Expires:  09/19/22    Subjective     Chief Complaint: n/a  Patient/Family Comments/goals: to go home  Pain/Comfort:  · Pain Rating 1: 3/10  · Location - Side 1: Left  · Location 1: hip    Patients cultural, spiritual, Tenriism conflicts given the current situation: no    Living Environment:  Pt reports living in a [facility] with sister; has ramp entrance.  Prior to admission, patients level of function was independent.    Equipment used at home: walker, rolling.  DME owned (not currently used): rolling walker.    Upon discharge, patient will have assistance from sister.    Objective:     Communicated with NSG prior to session.  Patient found supine with pulse ox (continuous), telemetry, PureWick  upon PT entry to room.    General Precautions: Standard, fall   Orthopedic Precautions:LLE weight bearing as  tolerated   Braces: N/A  Respiratory Status: 4L/min oxymask  Vitals   BP (supine): 91/56   BP (sitting EOB): 106/65    Exams:  · Cognitive Exam:  Patient is oriented to Person, Place, Time and Situation  · RLE Strength: WFL  · LLE Strength: NT 2/2 sx site    Functional Mobility:  · Bed Mobility:     · Supine to Sit: moderate assistance  · Sit to Supine: minimum assistance  · Transfers:     · Sit to Stand:  minimum assistance with rolling walker  · Gait: pt ambulated 10ft fwd/bkwd with use of RW and CGA; decreased step length; increased pain in stance phase; slow pace; step-to pattern    AM-PAC 6 CLICK MOBILITY  Total Score:18     Patient left right sidelying with all lines intact, call button in reach, sister present and wedge btw legs.    GOALS:   Multidisciplinary Problems     Physical Therapy Goals        Problem: Physical Therapy    Goal Priority Disciplines Outcome Goal Variances Interventions   Physical Therapy Goal     PT, PT/OT Ongoing, Progressing     Description: Goals to be met by: 22    Patient will increase functional independence with mobility by performin. Supine to sit with Stand-by Assistance  2. Sit to supine with Stand-by Assistance  3. Sit to stand transfer with Stand-by Assistance  4. Gait  x 150 feet with Stand-by Assistance using Rolling Walker.                      History:     Past Medical History:   Diagnosis Date    Depression     Hypertension     Mixed hyperlipidemia     Stress incontinence of urine        History reviewed. No pertinent surgical history.    Time Tracking:     PT Received On: 22  PT Start Time: 1532     PT Stop Time: 1549  PT Total Time (min): 17 min     Billable Minutes: Evaluation mod      2022

## 2022-08-19 NOTE — OP NOTE
[facility]                       [address] [id number]    PATIENT NAME:      KAIDEN FOY   YOB: 1950  CSN:               [id number]  MRN:               [id number]  ADMIT DATE:        08/17/2022 20:39:00  PHYSICIAN:         Florentin Gomez MD                          OPERATIVE REPORT      DATE OF SURGERY:    08/19/2022 00:00:00    SURGEON:  Florentin Gomez MD    PREOPERATIVE DIAGNOSIS:  Left displaced femoral neck fracture.    POSTOPERATIVE DIAGNOSIS:  Left displaced femoral neck fracture.    PROCEDURE:  Left hip hemiarthroplasty.    ANESTHESIA:  General.    ESTIMATED BLOOD LOSS:  150 mL.    ASSISTANT:  Edelmira Burton, nurse practitioner, necessary for a skilled set of   hands to assist with deep retraction as well as application of implants and deep   closure.    HARDWARE:  [place] high offset size 4 Insignia stem with a 46 mm bipolar and a   28, -4 head.    COMPLICATIONS:  None.    COUNTS:  All counts correct x2 at the end of the case.    INDICATIONS FOR PROCEDURE:  Ms. Foy is a 72-year-old female who walks with   a walker at baseline.  She had a fall at home.  She sustained a left displaced   femoral neck fracture.  She was seen and evaluated, admitted to the hospitalist   service.  The risks and benefits of treatment were discussed at length with the   patient.  Upon her arrival, she was seen initially by my partner, Dr. Patric Ac.  I have assumed care of her injury due to my earlier operative   availability.  Informed consent was again reviewed with the patient this   morning.  She understood and agreed with all that we have discussed.    PROCEDURE IN DETAIL:  The patient was met in the preoperative holding area.  The   site was marked.  She was taken to the operating room.  She was placed supine   on the operating room table.  General anesthesia was induced.  She was then   turned  to the right lateral decubitus position and stabilized on a pegboard.    All bony prominences were well padded, and her left lower extremity was prepped   and draped in a standard sterile fashion.  Time-out was done indicating correct   operative limb and procedure.  An incision was made over the lateral aspect of   the hip.  It was carried down through the IT band.  An anterolateral approach to   the joint was performed, taking off the anterior third of the abductors off the   greater troch for later repair.  An arthrotomy was performed.  The neck was   identified.  The neck was cut.  The head was removed and sized, and a 46 mm   trial reapproximated the size well.  The canal was then prepared using a box   osteotome, canal finder broaches.  We then trialed with a size 4 stem, -4 head,   46 mm bipolar.  This reapproximated her limb length, and her hip joint was   stable through range of motion.  The final implant was then impacted into   position.  She was again put through a range of motion and found to be stable.    The joint was then irrigated with a Betadine wash.  A cocktail injection was   then performed for pain control.  Layered closure was then performed using a #1   Vicryl for repair of her capsule, then a #5 FiberWire for repair of her   abductors through drill tunnels in the greater troch.  Looped PDS, #1 Vicryl,   2-0 Monocryl, 3-0 Stratafix, and Dermabond were applied.  The patient was   awakened, extubated, and taken to recovery in stable condition.    POSTOPERATIVE PLAN:  She will be admitted to the floor.  She can weightbear as   tolerated to the left lower extremity.  Full range of motion of the left lower   extremity.  She will need Case Management evaluation for placement.  Lovenox for   DVT prophylaxis.        ______________________________  MD NAZIA Kaiser/MOSHE  DD:  08/19/2022  Time:  11:23AM  DT:  08/19/2022  Time:  11:49AM  Job #:  [id number]      OPERATIVE REPORT

## 2022-08-19 NOTE — PLAN OF CARE
Problem: Physical Therapy  Goal: Physical Therapy Goal  Description: Goals to be met by: 22    Patient will increase functional independence with mobility by performin. Supine to sit with Stand-by Assistance  2. Sit to supine with Stand-by Assistance  3. Sit to stand transfer with Stand-by Assistance  4. Gait  x 150 feet with Stand-by Assistance using Rolling Walker.     Outcome: Ongoing, Progressing

## 2022-08-19 NOTE — ANESTHESIA PROCEDURE NOTES
Intubation    Date/Time: 8/19/2022 10:00 AM  Performed by: Wm Johnson CRNA  Authorized by: Bernardo Rios MD     Intubation:     Induction:  Intravenous    Intubated:  Postinduction    Mask Ventilation:  Easy mask    Attempts:  1    Attempted By:  CRNA    Method of Intubation:  Direct    Blade:  Red 2    Laryngeal View Grade: Grade I - full view of cords      Difficult Airway Encountered?: No      Complications:  None    Airway Device:  Oral endotracheal tube    Airway Device Size:  7.5    Style/Cuff Inflation:  Cuffed    Inflation Amount (mL):  7    Tube secured:  22    Secured at:  The lips    Placement Verified By:  Capnometry    Complicating Factors:  None    Findings Post-Intubation:  BS equal bilateral and atraumatic/condition of teeth unchanged

## 2022-08-19 NOTE — TRANSFER OF CARE
"Anesthesia Transfer of Care Note    Patient: Susy Foy    Procedure(s) Performed: Procedure(s) (LRB):  HEMIARTHROPLASTY, HIP (Left)    Patient location: PACU    Anesthesia Type: general    Transport from OR: Transported from OR on room air with adequate spontaneous ventilation    Post pain: adequate analgesia    Post assessment: no apparent anesthetic complications    Post vital signs: stable    Level of consciousness: awake and alert    Nausea/Vomiting: no nausea/vomiting    Complications: none    Transfer of care protocol was followed      Last vitals:   Visit Vitals  BP (!) 149/80 (BP Location: Left arm, Patient Position: Lying)   Pulse 82   Temp 37.2 °C (99 °F) (Skin)   Resp 12   Ht 5' 5" (1.651 m)   Wt 77.1 kg (169 lb 15.6 oz)   SpO2 99%   BMI 28.29 kg/m²     "

## 2022-08-19 NOTE — BRIEF OP NOTE
[facility]  Brief Operative Note    SUMMARY     Surgery Date: 8/19/2022     Surgeon(s) and Role:     * Florentin Gomez MD - Primary    Assisting Surgeon: None    Pre-op Diagnosis:  Left displaced femoral neck fracture [S72.002A]    Post-op Diagnosis:  Post-Op Diagnosis Codes:     * Left displaced femoral neck fracture [S72.002A]    Procedure(s) (LRB):  HEMIARTHROPLASTY, HIP (Left)    Anesthesia: General    Operative Findings: see dictated op report    Estimated Blood Loss: 150 mL           Specimens:   Specimen (24h ago, onward)             Start     Ordered    08/19/22 1042  Specimen to Pathology  RELEASE UPON ORDERING        References:    Click here for ordering Quick Tip   Question:  Release to patient  Answer:  Immediate    08/19/22 1042              A/P: Tolerated procedure well. Admit to floor. WBAT to LLE, full ROM LLE. Lovenox for DVT ppx. CM eval for placement.    [id number]

## 2022-08-19 NOTE — CONSULTS
[facility]  Orthopedic Surgery  Consult Note    Patient Name: Susy Foy  MRN: [id number]  Admission Date: 8/17/2022  Hospital Length of Stay: 1 days  Attending Provider: Juan Jose Medina MD  Primary Care Provider: Primary Doctor No    Consults  Subjective:     Chief Complaint:   Chief Complaint   Patient presents with    Fall     Going to bathroom lost balance and fell, l hip pain w/o shortening or rotation, pms intact      HPI:   Susy Foy is a very pleasant 72-year-old female who presented to the emergency department after a fall from ground level with left hip pain.  She lives at home with her sister.  She uses a walker at all times for an assistive device.  She was using her walker when she fell.  Pain is localized to the left hip.  No additional injuries reported.  No loss of consciousness.  She did have some shortness of breath.  She is 2 weeks out from being covid positive.  She was discharged from physical therapy here recently.  Pain is worse with movement and better with rest.  She denies any sensory changes distally.  In the emergency department she was placed on 3 L of oxygen and started on antibiotics for community-acquired pneumonia.  Past medical history significant for hypertension, undetectable HIV positive, and hyperlipidemia. Increased liver enzymes noted in the ED.     Past Medical History:   Diagnosis Date    Depression     Hypertension     Mixed hyperlipidemia     Stress incontinence of urine        No past surgical history on file.    Review of patient's allergies indicates:   Allergen Reactions    Codeine        Current Facility-Administered Medications   Medication    0.9%  NaCl infusion    [START ON 8/19/2022] amLODIPine tablet 5 mg    azithromycin (ZITHROMAX) 500 mg in dextrose 5 % 250 mL IVPB    baclofen tablet 10 mg    cefTRIAXone (ROCEPHIN) 1 g in dextrose 5 % in water (D5W) 5 % 50 mL IVPB (MB+)    docusate sodium capsule 100 mg     "donepeziL tablet 5 mg    enoxaparin injection 40 mg    gabapentin capsule 300 mg    hydrALAZINE injection 10 mg    melatonin tablet 6 mg    memantine tablet 5 mg    midodrine tablet 5 mg    morphine injection 2 mg    naloxone 0.4 mg/mL injection 0.02 mg    ondansetron injection 4 mg    [START ON 8/19/2022] oxybutynin tablet 5 mg    [START ON 8/19/2022] pantoprazole EC tablet 40 mg    sodium chloride 0.9% flush 10 mL    traZODone tablet 100 mg    [START ON 8/19/2022] vortioxetine Tab 10 mg     Family History    None       Tobacco Use    Smoking status: Not on file    Smokeless tobacco: Not on file   Substance and Sexual Activity    Alcohol use: Not on file    Drug use: Not on file    Sexual activity: Not on file       ROS:  Constitutional: Denies fever chills  Eyes: No change in vision  ENT: No ringing or current infections  CV: No chest pain  Resp: No labored breathing  MSK: Pain evident at site of injury located in HPI,   Integ: No signs of abrasions or lacerations  Neuro: No numbness or tingling  Lymphatic: No swelling outside the area of injury   Objective:     Vital Signs (Most Recent):  Temp: (!) 100.5 °F (38.1 °C) (08/18/22 1700)  Pulse: 87 (08/18/22 1520)  Resp: 17 (08/18/22 1520)  BP: (!) 144/72 (08/18/22 1520)  SpO2: (!) 92 % (08/18/22 1520) Vital Signs (24h Range):  Temp:  [97.9 °F (36.6 °C)-101.4 °F (38.6 °C)] 100.5 °F (38.1 °C)  Pulse:  [] 87  Resp:  [17-19] 17  SpO2:  [90 %-98 %] 92 %  BP: (123-154)/(65-91) 144/72     Weight: 77.1 kg (169 lb 15.6 oz)  Height: 5' 5" (165.1 cm)  Body mass index is 28.29 kg/m².      Intake/Output Summary (Last 24 hours) at 8/18/2022 1923  Last data filed at 8/18/2022 1833  Gross per 24 hour   Intake 240 ml   Output 500 ml   Net -260 ml       Ortho/SPM Exam  General the patient is alert and oriented x3 no acute distress nontoxic-appearing appropriate affect.    Constitutional: Vital signs are examined and stable.  Resp: NC O2    LUE: --Skin: bandage " c/d/i; no scars           -MSK: STR 5/5 AIN/PIN/Median/Radial/Ulnar motor           -Neuro:  Sensation intact to light touch C5-T1 dermatomes           -Lymphatic: No signs of lymphadenopathy, No signs of swelling,           -CV:Capillary refill is less than 2 seconds. Radial and ulnar pulses 2/4. Compartments Soft and compressible            RUE: -Skin: No signs of new abrasions or lacerations, no scars           -MSK: STR 5/5 AIN/PIN/Median/Radial/Ulnar motor,           -Neuro:  Sensation intact to light touch C5-T1 dermatomes           -Lymphatic: No signs of  lymphadenopathy,            -CV:  Capillary refill is less than 2 seconds. Radial and ulnar pulses 2/4. Compartments soft and compressible             LLE: -Skin: No signs of new abrasions or lacerations, no scars           -MSK: EHL/FHL, Gastroc/Tib anterior Strength 5/5. +TTP lateral hip. Pain with log roll.            -Neuro:  Sensation intact to light touch L3-S1 dermatomes           -Lymphatic: No signs of lymphadenopathy           -CV: Capillary refill is less than 2 seconds. DP/PT pulses 2/4. Compartments soft and compressible                      RLE: -Skin:No signs of new abrasions or lacerations, no scars           -MSK: : Hip and Knee F/E, EHL/FHL, Gastroc/Tib anterior Strength 5/5           -Neuro:  Sensation intact to light touch L3-S1 dermatomes           -Lymphatic: No signs of lymphadenopathy           -CV: Capillary refill is less than 2 seconds. DP/PT pulses  2/4. Compartments soft and compressible.     Significant Labs: All pertinent labs within the past 24 hours have been reviewed.  Recent Lab Results       08/18/22  1114   08/18/22  0459   08/17/22  2222   08/17/22  2049        Albumin/Globulin Ratio 0.9   0.8     0.8       Albumin 2.9   3.2     2.8       Alkaline Phosphatase 156   161     62          204     19          787     35       Baso #   0.04     0.07       Basophil %   0.4     0.5       BILIRUBIN TOTAL 0.6    0.9     0.3       BUN 15.3   18.6     19.6       Calcium 8.9   9.1     9.0       Chloride 105   107     106       CO2 28   17     24       ID NOW COVID-19, (KADIE)     Negative         Creatinine 0.98   1.41     1.22       eGFR >60   39     47       Eos #   0.50     0.52       Eosinophil %   5.3     3.9       Globulin, Total 3.2   4.1     3.6       Glucose 91   83     97       Hematocrit   42.1     32.9       Hemoglobin   12.5     10.2       Immature Grans (Abs)   0.03     0.05       Immature Granulocytes   0.3     0.4       Lymph #   0.77     1.00       LYMPH %   8.1     7.4       Magnesium   2.00           MCH   28.9     28.7       MCHC   29.7     31.0       MCV   97.5     92.4       Mono #   0.35     0.49       Mono %   3.7     3.6       MPV   9.5     9.2       Neut #   7.8     11.3       Neut %   82.2     84.2       nRBC   0.0     0.0       Phosphorus   4.6           Platelets   203     223       Potassium 4.3   4.9     4.1       PROTEIN TOTAL 6.1   7.3     6.4       RBC   4.32     3.56       RDW   14.6     14.6       Sodium 142   140     140       WBC   9.5     13.4              Significant Imaging: I have reviewed all pertinent imaging results/findings.  X-Ray Hip 2 or 3 views Left (with Pelvis when performed)    Result Date: 8/18/2022  EXAMINATION: COMPLETE LEFT HIP AND PELVIS X-RAYS: CPT: 95094, 20940 CLINICAL HISTORY: hip pain; FINDINGS: Displaced subcapital fracture of the left hip with no other definite fractures or dislocations identified     Fracture as above Electronically signed by: Emiliano Esteban Date:    08/18/2022 Time:    08:47    X-Ray Chest AP Portable    Result Date: 8/18/2022  EXAMINATION: XR CHEST AP PORTABLE CLINICAL HISTORY: shortness of breath; TECHNIQUE: Single view of the chest COMPARISON: No prior imaging available for comparison. FINDINGS: No focal opacification, pleural effusion, or pneumothorax. The cardiomediastinal silhouette is within normal limits. No acute osseous abnormality.      No acute cardiopulmonary process.  Prominent interstitial markings with no focal opacification. Electronically signed by: Javier Lerner Date:    08/18/2022 Time:    07:08    US Abdomen Limited    Result Date: 8/18/2022  EXAMINATION: US ABDOMEN LIMITED CLINICAL HISTORY: Transaminitis; TECHNIQUE: Limited ultrasound of the right upper quadrant of the abdomen was performed. COMPARISON: None FINDINGS: LIMITATIONS: Exam limited due to poor acoustic window related to shadowing bowel gas and/or body habitus. LIVER: 15 cm cranial caudal at the midclavicular line. Heterogeneous echotexture.  Portal vein is patent with appropriate directional flow. PANCREAS: Obscured by overlying bowel gas. GALLBLADDER: The gallbladder is surgically absent. BILE DUCTS: 9 mm common bile duct.  Distal common bile duct is obscured by shadowing bowel gas. INFERIOR VENA CAVA: Obscured by shadowing RIGHT KIDNEY: Suboptimally visualized.  Length estimated at 9 cm. No hydronephrosis. OTHER: No ascites.  Aorta is obscured by shadowing bowel gas.     Technically limited exam due to acoustic shadowing. The gallbladder is not seen and is reportedly surgically absent.  There is age indeterminate ectasia of the common bile duct. Heterogeneous echotexture of the liver may be related to steatosis, hepatocellular disease or infiltrative lesions.  This is poorly characterized on the current exam due to poor acoustic window. Electronically signed by: Aga Carrero Date:    08/18/2022 Time:    10:51       Assessment/Plan:     Active Diagnoses:    Diagnosis Date Noted POA    PRINCIPAL PROBLEM:  Closed left hip fracture [S72.002A] 08/18/2022 Yes      Problems Resolved During this Admission:     72F s/p ground level fall with L displaced FN fracture    She lives at home with her sister and ambulates with a walker and sustained a fall. No additional injuries noted. We discussed operative and nonoperative options. The patient had an opportunity to ask  questions. All questions were answered. The risks and benefits of surgery were discussed with the patient, including but not limited to bleeding, infection, damage to surrounding nerves and structures, need for further surgery, dislocation, instability, stiffness, anesthesia risk, progression of arthritis, blood clots, and medical risks of surgery. The patient voiced understanding of the risks and benefits and provided verbal consent to the procedure. Plan for left hip hemiarthroplasty when cleared and as timing allows. Elevated liver enzymes has been investigated with ultrasound and plan for monitoring labs. She is being treated with abx for CAP. NPO for OR on 8/19/22. Pain control. DVT ppx - mechanical. Mobilize postop. Plan for placement postoperatively.     Patric Ac MD  Orthopedic Surgery  [facility]

## 2022-08-20 LAB
ALBUMIN SERPL-MCNC: 2.3 GM/DL (ref 3.4–4.8)
ALBUMIN/GLOB SERPL: 0.9 RATIO (ref 1.1–2)
ALP SERPL-CCNC: 90 UNIT/L (ref 40–150)
ALT SERPL-CCNC: 49 UNIT/L (ref 0–55)
AST SERPL-CCNC: 48 UNIT/L (ref 5–34)
BILIRUBIN DIRECT+TOT PNL SERPL-MCNC: 0.3 MG/DL
BUN SERPL-MCNC: 12.6 MG/DL (ref 9.8–20.1)
CALCIUM SERPL-MCNC: 8 MG/DL (ref 8.4–10.2)
CHLORIDE SERPL-SCNC: 106 MMOL/L (ref 98–107)
CO2 SERPL-SCNC: 24 MMOL/L (ref 23–31)
CREAT SERPL-MCNC: 0.73 MG/DL (ref 0.55–1.02)
ERYTHROCYTE [DISTWIDTH] IN BLOOD BY AUTOMATED COUNT: 14.2 % (ref 11.5–17)
GFR SERPLBLD CREATININE-BSD FMLA CKD-EPI: >60 MLS/MIN/1.73/M2
GLOBULIN SER-MCNC: 2.7 GM/DL (ref 2.4–3.5)
GLUCOSE SERPL-MCNC: 110 MG/DL (ref 82–115)
HCT VFR BLD AUTO: 23.9 % (ref 37–47)
HGB BLD-MCNC: 7.4 GM/DL (ref 12–16)
MCH RBC QN AUTO: 29.1 PG (ref 27–31)
MCHC RBC AUTO-ENTMCNC: 31 MG/DL (ref 33–36)
MCV RBC AUTO: 94.1 FL (ref 80–94)
NRBC BLD AUTO-RTO: 0 %
PATH REV: NORMAL
PLATELET # BLD AUTO: 123 X10(3)/MCL (ref 130–400)
PMV BLD AUTO: 10.8 FL (ref 7.4–10.4)
POTASSIUM SERPL-SCNC: 4 MMOL/L (ref 3.5–5.1)
PROT SERPL-MCNC: 5 GM/DL (ref 5.8–7.6)
RBC # BLD AUTO: 2.54 X10(6)/MCL (ref 4.2–5.4)
SODIUM SERPL-SCNC: 139 MMOL/L (ref 136–145)
WBC # SPEC AUTO: 11.4 X10(3)/MCL (ref 4.5–11.5)

## 2022-08-20 PROCEDURE — 25000003 PHARM REV CODE 250: Performed by: NURSE PRACTITIONER

## 2022-08-20 PROCEDURE — 27000221 HC OXYGEN, UP TO 24 HOURS

## 2022-08-20 PROCEDURE — 63600175 PHARM REV CODE 636 W HCPCS: Performed by: NURSE PRACTITIONER

## 2022-08-20 PROCEDURE — 97530 THERAPEUTIC ACTIVITIES: CPT | Mod: CQ

## 2022-08-20 PROCEDURE — 85027 COMPLETE CBC AUTOMATED: CPT | Performed by: NURSE PRACTITIONER

## 2022-08-20 PROCEDURE — 36415 COLL VENOUS BLD VENIPUNCTURE: CPT | Performed by: NURSE PRACTITIONER

## 2022-08-20 PROCEDURE — 99900035 HC TECH TIME PER 15 MIN (STAT)

## 2022-08-20 PROCEDURE — 11000001 HC ACUTE MED/SURG PRIVATE ROOM

## 2022-08-20 PROCEDURE — 80053 COMPREHEN METABOLIC PANEL: CPT | Performed by: NURSE PRACTITIONER

## 2022-08-20 PROCEDURE — 94799 UNLISTED PULMONARY SVC/PX: CPT

## 2022-08-20 RX ADMIN — BACLOFEN 10 MG: 10 TABLET ORAL at 08:08

## 2022-08-20 RX ADMIN — ENOXAPARIN SODIUM 40 MG: 40 INJECTION SUBCUTANEOUS at 04:08

## 2022-08-20 RX ADMIN — OXYBUTYNIN CHLORIDE 5 MG: 5 TABLET ORAL at 06:08

## 2022-08-20 RX ADMIN — DONEPEZIL HYDROCHLORIDE 5 MG: 5 TABLET, FILM COATED ORAL at 08:08

## 2022-08-20 RX ADMIN — CEFTRIAXONE SODIUM 1 G: 1 INJECTION, POWDER, FOR SOLUTION INTRAMUSCULAR; INTRAVENOUS at 11:08

## 2022-08-20 RX ADMIN — DOCUSATE SODIUM 100 MG: 100 CAPSULE, LIQUID FILLED ORAL at 08:08

## 2022-08-20 RX ADMIN — MEMANTINE 5 MG: 5 TABLET ORAL at 08:08

## 2022-08-20 RX ADMIN — GABAPENTIN 300 MG: 300 CAPSULE ORAL at 01:08

## 2022-08-20 RX ADMIN — BACLOFEN 10 MG: 10 TABLET ORAL at 01:08

## 2022-08-20 RX ADMIN — ACETAMINOPHEN 650 MG: 325 TABLET ORAL at 04:08

## 2022-08-20 RX ADMIN — GABAPENTIN 300 MG: 300 CAPSULE ORAL at 08:08

## 2022-08-20 RX ADMIN — TRAZODONE HYDROCHLORIDE 100 MG: 100 TABLET ORAL at 08:08

## 2022-08-20 RX ADMIN — ACETAMINOPHEN 1000 MG: 10 INJECTION, SOLUTION INTRAVENOUS at 06:08

## 2022-08-20 RX ADMIN — AZITHROMYCIN MONOHYDRATE 500 MG: 500 INJECTION, POWDER, LYOPHILIZED, FOR SOLUTION INTRAVENOUS at 11:08

## 2022-08-20 NOTE — PT/OT/SLP PROGRESS
Physical Therapy Treatment    Patient Name:  Susy Foy   MRN:  [id number]    Recommendations:     Discharge Recommendations:  home health PT (Rehab versus HH if pt has help at home)   Discharge Equipment Recommendations: walker, rolling   Barriers to discharge:      Assessment:     Susy Foy is a 72 y.o. female admitted with a medical diagnosis of Left displaced femoral neck fracture.  She presents with the following impairments/functional limitations:  weakness, gait instability, impaired functional mobility, impaired endurance .    Rehab Prognosis: Good; patient would benefit from acute skilled PT services to address these deficits and reach maximum level of function.    Recent Surgery: Procedure(s) (LRB):  HEMIARTHROPLASTY, HIP (Left) 1 Day Post-Op    Plan:     During this hospitalization, patient to be seen BID to address the identified rehab impairments via gait training, therapeutic activities, therapeutic exercises and progress toward the following goals:    · Plan of Care Expires:  09/19/22    Subjective     Chief Complaint:   Patient/Family Comments/goals:  Pain/Comfort:  · Pain Rating 1: 9/10  · Location - Side 1: Left  · Location 1: hip  · Pain Addressed 1: Reposition      Objective:     Communicated with NSG prior to session.  Patient found supine with PureWick (oxymask), and CNA present upon PTA entry to room.     General Precautions: Standard, fall   Orthopedic Precautions:LLE weight bearing as tolerated   Braces: N/A  Respiratory Status: oxymask 4 L     Functional Mobility:  · Bed: Tab supine to sit EOB  · T/F: Tab sit <-> stand; VCs for technique. Educated pt on hip pxn  · Gait: Pt amb for 60 ft step through gait pattern, no major LOB. Tab for balance.      Therapeutic Activities and Exercises:  B LE: ankle pumps, quad sets  L LE: hip flexion, hip abd,., hip add, heel slide.     Patient left up in chair with all lines intact, call button in reach and sister present..    GOALS:    Multidisciplinary Problems     Physical Therapy Goals        Problem: Physical Therapy    Goal Priority Disciplines Outcome Goal Variances Interventions   Physical Therapy Goal     PT, PT/OT Ongoing, Progressing     Description: Goals to be met by: 22    Patient will increase functional independence with mobility by performin. Supine to sit with Stand-by Assistance  2. Sit to supine with Stand-by Assistance  3. Sit to stand transfer with Stand-by Assistance  4. Gait  x 150 feet with Stand-by Assistance using Rolling Walker.                      Time Tracking:     PT Received On:    PT Start Time: 1023     PT Stop Time: 1056  PT Total Time (min): 33 min     Billable Minutes: Therapeutic Activity 33    Treatment Type: Treatment  PT/PTA: PTA     PTA Visit Number: 1     2022

## 2022-08-20 NOTE — PROGRESS NOTES
[facility] Neuro  Orthopedic Surgery  Progress Note:      Patient Name: Susy Foy  MRN: [id number]  Admission Date: 8/17/2022  Attending Provider:  Juan Jose Medina MD  Hospital Length of Stay:  3 days  Postoperative Day: 1 Day Post-Op      SUBJECTIVE:  Postop day one status post left hip hemiarthroplasty. Patient reports no acute issues overnight and adequate control of pain on current regimen.  Patient worked with physical therapy over the last 24 hours.        OBJECTIVE:    Physical Examination:  General: Well-developed, well-nourished.  Neuro: Alert and oriented x 3.  Psych: Normal mood and affect.  Left lower extremity exam: Dressings clean, dry, and intact with no drainage.  2+ DP pulses and capillary refill less than 3 seconds.  Movement intact at ankle and digits. No calf tenderness.  Sensation intact to light touch. Abduction pillow in place.    Vitals:    08/19/22 1946 08/19/22 2336 08/20/22 0426 08/20/22 0727   BP: (!) 102/58 (!) 91/55 108/61 109/66   BP Location:       Patient Position:       Pulse: 98 (!) 58 61 (!) 52   Resp:  18 18 19   Temp: 97.5 °F (36.4 °C) 98.2 °F (36.8 °C) 97.6 °F (36.4 °C) 97.8 °F (36.6 °C)   TempSrc: Oral Oral Oral Oral   SpO2: (!) 90% 97% 98% 96%   Weight:       Height:         I/O last 3 completed shifts:  In: 1000 [IV Piggyback:1000]  Out: 1350 [Urine:1200; Blood:150]    Medications:  Scheduled Meds:   acetaminophen  1,000 mg Intravenous Q8H    amLODIPine  5 mg Oral Daily    azithromycin (ZITHROMAX) 500 mg IVPB  500 mg Intravenous Q24H    baclofen  10 mg Oral TID    cefTRIAXone (ROCEPHIN) IVPB  1 g Intravenous Q24H    docusate sodium  100 mg Oral QHS    donepeziL  5 mg Oral QHS    enoxaparin  40 mg Subcutaneous Daily    gabapentin  300 mg Oral BID    memantine  5 mg Oral BID    midodrine  5 mg Oral BID WM    ondansetron  4 mg Intravenous Once    oxybutynin  5 mg Oral Daily    pantoprazole  40 mg Oral Daily    traZODone  100 mg Oral QHS     vortioxetine  10 mg Oral Daily     Continuous Infusions:   sodium chloride 0.9% 75 mL/hr at 08/18/22 1833     PRN Meds:.acetaminophen, hydrALAZINE, melatonin, methocarbamoL, morphine, naloxone, ondansetron, sodium chloride 0.9%    Significant Labs: All pertinent labs within the past 24 hours have been reviewed.  Recent Lab Results  (Last 5 results in the past 72 hours)      08/20/22  0637   08/19/22  0918   08/19/22  0745   08/19/22  0540   08/18/22  1114        Albumin/Globulin Ratio 0.9       0.8   0.9       Albumin 2.3       2.4   2.9       Alkaline Phosphatase 90       120   156       ALT 49       90   166       AST 48       131   435       Baso #       0.07         Basophil %       0.7         BILIRUBIN TOTAL 0.3       0.5   0.6       BUN 12.6       12.7   15.3       Calcium 8.0       8.2   8.9       Chloride 106       102   105       CO2 24       27   28       Creatinine 0.73       0.77   0.98       eGFR >60       >60   >60       Eos #       1.01         Eosinophil %       9.6         Globulin, Total 2.7       2.9   3.2       Glucose 110       86   91       Group & Rh   O POS             Hematocrit 23.9       30.9         Hemoglobin 7.4       9.7         Hep A IgM     Nonreactive           Hep B C IgM     Nonreactive           Hepatitis C Ab     Nonreactive           Hepatitis B Surface Antigen     Nonreactive           Immature Grans (Abs)       0.05         Immature Granulocytes       0.5         Indirect Srinivas GEL   NEG             Lymph #       1.31         LYMPH %       12.5         MCH 29.1       29.3         MCHC 31.0       31.4         MCV 94.1       93.4         Mono #       0.61         Mono %       5.8         MPV 10.8       9.7         Neut #       7.4         Neut %       70.9         nRBC 0.0       0.0         Platelets 123       158         Potassium 4.0       3.5   4.3       PROTEIN TOTAL 5.0       5.3   6.1       RBC 2.54       3.31         RDW 14.2       14.5         Sodium 139        136   142       WBC 11.4       10.5                              Significant Imaging:  I have reviewed all pertinent imaging results/findings.  X-Ray Hip 2 or 3 views Left (with Pelvis when performed)    Result Date: 8/19/2022  EXAMINATION: XR HIP WITH PELVIS WHEN PERFORMED, 2 OR 3 VIEWS LEFT CLINICAL HISTORY: post op; COMPARISON: None. FINDINGS: Left hip prostheses in good anatomic alignment and position only 1 projection provided No acute displaced fractures or dislocations. Joint spaces preserved. No blastic or lytic lesions. Soft tissues within normal limits.     Left hip prostheses. Electronically signed by: Emiliano Esteban Date:    08/19/2022 Time:    13:06    X-Ray Hip 2 or 3 views Left (with Pelvis when performed)    Result Date: 8/18/2022  EXAMINATION: COMPLETE LEFT HIP AND PELVIS X-RAYS: CPT: 36426, 87436 CLINICAL HISTORY: hip pain; FINDINGS: Displaced subcapital fracture of the left hip with no other definite fractures or dislocations identified     Fracture as above Electronically signed by: Emiliano Esteban Date:    08/18/2022 Time:    08:47    X-Ray Chest AP Portable    Result Date: 8/18/2022  EXAMINATION: XR CHEST AP PORTABLE CLINICAL HISTORY: shortness of breath; TECHNIQUE: Single view of the chest COMPARISON: No prior imaging available for comparison. FINDINGS: No focal opacification, pleural effusion, or pneumothorax. The cardiomediastinal silhouette is within normal limits. No acute osseous abnormality.     No acute cardiopulmonary process.  Prominent interstitial markings with no focal opacification. Electronically signed by: Javier Lerner Date:    08/18/2022 Time:    07:08    US Abdomen Limited    Result Date: 8/18/2022  EXAMINATION: US ABDOMEN LIMITED CLINICAL HISTORY: Transaminitis; TECHNIQUE: Limited ultrasound of the right upper quadrant of the abdomen was performed. COMPARISON: None FINDINGS: LIMITATIONS: Exam limited due to poor acoustic window related to shadowing bowel gas and/or  body habitus. LIVER: 15 cm cranial caudal at the midclavicular line. Heterogeneous echotexture.  Portal vein is patent with appropriate directional flow. PANCREAS: Obscured by overlying bowel gas. GALLBLADDER: The gallbladder is surgically absent. BILE DUCTS: 9 mm common bile duct.  Distal common bile duct is obscured by shadowing bowel gas. INFERIOR VENA CAVA: Obscured by shadowing RIGHT KIDNEY: Suboptimally visualized.  Length estimated at 9 cm. No hydronephrosis. OTHER: No ascites.  Aorta is obscured by shadowing bowel gas.     Technically limited exam due to acoustic shadowing. The gallbladder is not seen and is reportedly surgically absent.  There is age indeterminate ectasia of the common bile duct. Heterogeneous echotexture of the liver may be related to steatosis, hepatocellular disease or infiltrative lesions.  This is poorly characterized on the current exam due to poor acoustic window. Electronically signed by: Aga Carrero Date:    08/18/2022 Time:    10:51    X-Ray Hip 2 or 3 views Left (with Pelvis when performed)  Narrative: EXAMINATION:  XR HIP WITH PELVIS WHEN PERFORMED, 2 OR 3 VIEWS LEFT    CLINICAL HISTORY:  post op;    COMPARISON:  None.    FINDINGS:  Left hip prostheses in good anatomic alignment and position only 1 projection provided    No acute displaced fractures or dislocations.    Joint spaces preserved.    No blastic or lytic lesions.    Soft tissues within normal limits.  Impression: Left hip prostheses.    Electronically signed by: Emiliano Esteban  Date:    08/19/2022  Time:    13:06         ASSESSMENT/PLAN: 72 y.o. female 1 Day Post-Op s/p left hip hemiarthroplasty  -H&H 7.4/23.9 this morning, defer management to primary team  -Continue with current pain control regimen  -Continue with current physical therapy and weight-bearing as tolerated to the left lower extremity, full range of motion.  -Continue with DVT prophylaxis  -Disposition pending      This note was created with the  assistance of voice recognition software or phone dictation.  There may be transcription errors as a result of using this technology however minimal. Effort has been made to assure accuracy of transcription but any obvious errors or omissions should be clarified with the author of the document.

## 2022-08-20 NOTE — ANESTHESIA POSTPROCEDURE EVALUATION
Anesthesia Post Evaluation    Patient: Susy Foy    Procedure(s) Performed: Procedure(s) (LRB):  HEMIARTHROPLASTY, HIP (Left)    Final Anesthesia Type: general      Patient location during evaluation: PACU  Patient participation: Yes- Able to Participate  Level of consciousness: awake and alert and oriented  Post-procedure vital signs: reviewed and stable  Pain management: adequate  Airway patency: patent  EDUARDO mitigation strategies: Verification of full reversal of neuromuscular block  PONV status at discharge: No PONV  Anesthetic complications: no      Cardiovascular status: blood pressure returned to baseline and stable  Respiratory status: spontaneous ventilation, unassisted and face mask  Hydration status: euvolemic  Follow-up not needed.  Comments: [facility]          Vitals Value Taken Time   /63 08/20/22 1122   Temp 36.6 °C (97.9 °F) 08/20/22 1122   Pulse 57 08/20/22 1122   Resp 20 08/20/22 1122   SpO2 100 % 08/20/22 1122         Event Time   Out of Recovery 08/19/2022 13:40:00         Pain/Reggie Score: Pain Rating Prior to Med Admin: 0 (8/20/2022  8:00 AM)  Pain Rating Post Med Admin: 0 (8/20/2022  8:00 AM)  Reggie Score: 9 (8/19/2022  1:30 PM)

## 2022-08-20 NOTE — PROGRESS NOTES
[facility]  Hospital Medicine Progress Note        Chief Complaint: Inpatient Follow-up for left hip fracture due to fall at home     HPI: 72-year-old female with known past medical history of hypertension, hyperlipidemia, HIV positive on cabenuva with HIV viral load undetectable, dementia, admitted 08/18/2022 after cleaning up for on 08/17/2022 at her home falling on her left hip, patient reports he did not hit her head.  Complained of pain in the left hip as well.  Patient reports she was able to get up from the fall but was unable to bear weight on that leg.   Patient and her sister lives together, sister is at bedside informed that there just recovering from COVID infection 2 weeks ago, did not needed to be admitted to the hospital, was treated outpatient with antibiotic by their PCP.  They are both vaccinated and boosted.  Patient still complains of nonproductive cough.  Denies chest pain shortness of breath fever abdominal pain nausea vomiting diarrhea or constipation.  No urinary symptoms as well.  Patient reports at home she usually walks with a walker and does not need assistance with activities of daily living   In the ER patient was noted to have lower oxygen saturation 91% so she was started on supplemental oxygen via nasal cannula.  Mild leukocytosis of 13.4, liver enzymes were normal yesterday but this morning it has significantly increased.  COVID rapid was negative.  X-ray chest without acute pulmonary processes but prominent interstitial marking without focal opacification.  Hip x-ray confirmed left hip fracture.   Discussed orthopedic has been consulted, will keep her NPO till the evaluate for probable surgery today.  Verbalized understanding     Interval Hx:   Siting in bed starting to eat her breakfast.  For feeling good.  Had physical therapy yesterday and plan to walk in the hallway today.  Patient reported she wants to go to rehab before going home  Afebrile in  the last 24 hours  Case was discussed with patient nurse on the floor    Objective/physical exam:  General:   sitting in bed  Chest: Clear to auscultation bilaterally anteriorly   Heart: S1, S2, no appreciable murmur   Abdomen: Soft, nontender, BS +   MSK:  Dressing left leg  Neurologic: Alert and oriented x4, cranial nerves 2-12 grossly intact.  Answered all the questions appropriately     VITAL SIGNS: 24 HRS MIN & MAX LAST   Temp  Min: 97.5 °F (36.4 °C)  Max: 101.5 °F (38.6 °C) 97.6 °F (36.4 °C)   BP  Min: 87/52  Max: 149/80 108/61   Pulse  Min: 58  Max: 107  61   Resp  Min: 11  Max: 23 18   SpO2  Min: 89 %  Max: 99 % 98 %       Recent Labs   Lab 08/17/22 2049 08/18/22 0459 08/19/22  0540   WBC 13.4* 9.5 10.5   RBC 3.56* 4.32 3.31*   HGB 10.2* 12.5 9.7*   HCT 32.9* 42.1 30.9*   MCV 92.4 97.5* 93.4   MCH 28.7 28.9 29.3   MCHC 31.0* 29.7* 31.4*   RDW 14.6 14.6 14.5    203 158   MPV 9.2 9.5 9.7       Recent Labs   Lab 08/18/22 0459 08/18/22  1114 08/19/22  0540    142 136   K 4.9 4.3 3.5   CO2 17* 28 27   BUN 18.6 15.3 12.7   CREATININE 1.41* 0.98 0.77   CALCIUM 9.1 8.9 8.2*   MG 2.00  --   --    ALBUMIN 3.2* 2.9* 2.4*   ALKPHOS 161* 156* 120   * 166* 90*   * 435* 131*   BILITOT 0.9 0.6 0.5          Microbiology Results (last 7 days)     Procedure Component Value Units Date/Time    Blood Culture [[id number]] Collected: 08/19/22 0823    Order Status: Resulted Specimen: Blood from Wrist, Right Updated: 08/19/22 0832           See below for Radiology    Scheduled Med:   acetaminophen  1,000 mg Intravenous Q8H    amLODIPine  5 mg Oral Daily    azithromycin (ZITHROMAX) 500 mg IVPB  500 mg Intravenous Q24H    baclofen  10 mg Oral TID    cefTRIAXone (ROCEPHIN) IVPB  1 g Intravenous Q24H    docusate sodium  100 mg Oral QHS    donepeziL  5 mg Oral QHS    enoxaparin  40 mg Subcutaneous Daily    gabapentin  300 mg Oral BID    memantine  5 mg Oral BID    midodrine  5 mg Oral BID WM     ondansetron  4 mg Intravenous Once    oxybutynin  5 mg Oral Daily    pantoprazole  40 mg Oral Daily    traZODone  100 mg Oral QHS    vortioxetine  10 mg Oral Daily        Continuous Infusions:   sodium chloride 0.9% 75 mL/hr at 08/18/22 1833        PRN Meds:  acetaminophen, hydrALAZINE, melatonin, methocarbamoL, morphine, naloxone, ondansetron, sodium chloride 0.9%       Assessment/Plan:  Left hip fracture secondary to ground level fall at home  s/p L hip hemiarthoplasty 8/19  Postop anemia  ? Community-acquired pneumonia vs residual from recent covid infection   Acute kidney injury- resolved    Transaminitis - tended down   Essential hypertension   HIV positive on Cabenuva- viral load undetected           Admitted as inpatient on 8/18   Ortho on board, status post left hip hemiarthroplasty on 08/19  Postop day 1  Noted postop anemia with hemoglobin dropped to 7.6, will monitor, will transfuse if less than 7 or if patient becomes symptomatic  IV Morphine for pain, noted pt allergic to Codeine, gives her palpitations and chest heaviness    Continue with Rocephin and azithromycin empirically- day 3 of 5  8/19- Noted 1 fever spike of 101.5, order blood cultures x2, the only 1 culture was collected  Blood cx prelim is negative x 24 hrs   Right upper quadrant ultrasound ordered for elevated liver enzymes --> Heterogeneous echotexture of the liver may be related to steatosis, hepatocellular disease or infiltrative lesions.  This is poorly characterized on the current exam due to poor acoustic window   Acute hep panel --> nonreactive  Liver enzymes trending down  D/C IVF as patient consuming orally  IV hydralazine as needed for hypertension   Resumed appropriate home medication for chronic medical condition  Hold antihypertensive medication it is if systolic blood pressure less than 110  Continue midodrine  I will hold Statin till liver function normalize  PT OT consulted postop recommended rehab/ out pt therapy but  pt more comfortable with inpatient rehab  Morning cbc , cmp ordered        VTE Prophylaxis: Lovenox for DVT prophylaxis and will be advised to be as mobile as possible and sit in a chair as tolerated       Patient condition:  Stable        Discharge Planning and Disposition/Anticipated discharge:  Rehab once accepted       All diagnosis and differential diagnosis have been reviewed; assessment and plan has been documented; I have personally reviewed the labs and test results that are presently available; I have reviewed the patients medication list; I have reviewed the consulting providers response and recommendations. I have reviewed or attempted to review medical records based upon their availability    All of the patient's questions have been  addressed and answered. Patient's is agreeable to the above stated plan. I will continue to monitor closely and make adjustments to medical management as needed.  _____________________________________________________________________    Nutrition Status:    Radiology:  X-Ray Hip 2 or 3 views Left (with Pelvis when performed)  Narrative: EXAMINATION:  XR HIP WITH PELVIS WHEN PERFORMED, 2 OR 3 VIEWS LEFT    CLINICAL HISTORY:  post op;    COMPARISON:  None.    FINDINGS:  Left hip prostheses in good anatomic alignment and position only 1 projection provided    No acute displaced fractures or dislocations.    Joint spaces preserved.    No blastic or lytic lesions.    Soft tissues within normal limits.  Impression: Left hip prostheses.    Electronically signed by: Emiliano Esteban  Date:    08/19/2022  Time:    13:06      Juan Jose Medina MD   08/20/2022

## 2022-08-21 LAB
ALBUMIN SERPL-MCNC: 2.3 GM/DL (ref 3.4–4.8)
ALBUMIN/GLOB SERPL: 0.8 RATIO (ref 1.1–2)
ALP SERPL-CCNC: 96 UNIT/L (ref 40–150)
ALT SERPL-CCNC: 36 UNIT/L (ref 0–55)
AST SERPL-CCNC: 34 UNIT/L (ref 5–34)
BILIRUBIN DIRECT+TOT PNL SERPL-MCNC: 0.4 MG/DL
BUN SERPL-MCNC: 9.9 MG/DL (ref 9.8–20.1)
CALCIUM SERPL-MCNC: 8.2 MG/DL (ref 8.4–10.2)
CHLORIDE SERPL-SCNC: 107 MMOL/L (ref 98–107)
CO2 SERPL-SCNC: 27 MMOL/L (ref 23–31)
CREAT SERPL-MCNC: 0.69 MG/DL (ref 0.55–1.02)
ERYTHROCYTE [DISTWIDTH] IN BLOOD BY AUTOMATED COUNT: 14.7 % (ref 11.5–17)
GFR SERPLBLD CREATININE-BSD FMLA CKD-EPI: >60 MLS/MIN/1.73/M2
GLOBULIN SER-MCNC: 2.9 GM/DL (ref 2.4–3.5)
GLUCOSE SERPL-MCNC: 76 MG/DL (ref 82–115)
HCT VFR BLD AUTO: 26 % (ref 37–47)
HGB BLD-MCNC: 8 GM/DL (ref 12–16)
MCH RBC QN AUTO: 29.2 PG (ref 27–31)
MCHC RBC AUTO-ENTMCNC: 30.8 MG/DL (ref 33–36)
MCV RBC AUTO: 94.9 FL (ref 80–94)
NRBC BLD AUTO-RTO: 0 %
PLATELET # BLD AUTO: 177 X10(3)/MCL (ref 130–400)
PMV BLD AUTO: 10.9 FL (ref 7.4–10.4)
POTASSIUM SERPL-SCNC: 4 MMOL/L (ref 3.5–5.1)
PROT SERPL-MCNC: 5.2 GM/DL (ref 5.8–7.6)
RBC # BLD AUTO: 2.74 X10(6)/MCL (ref 4.2–5.4)
SODIUM SERPL-SCNC: 141 MMOL/L (ref 136–145)
WBC # SPEC AUTO: 10.2 X10(3)/MCL (ref 4.5–11.5)

## 2022-08-21 PROCEDURE — 25000003 PHARM REV CODE 250: Performed by: NURSE PRACTITIONER

## 2022-08-21 PROCEDURE — 25000003 PHARM REV CODE 250: Performed by: INTERNAL MEDICINE

## 2022-08-21 PROCEDURE — 27000221 HC OXYGEN, UP TO 24 HOURS

## 2022-08-21 PROCEDURE — 63600175 PHARM REV CODE 636 W HCPCS: Performed by: NURSE PRACTITIONER

## 2022-08-21 PROCEDURE — 97530 THERAPEUTIC ACTIVITIES: CPT | Mod: CO

## 2022-08-21 PROCEDURE — 80053 COMPREHEN METABOLIC PANEL: CPT | Performed by: INTERNAL MEDICINE

## 2022-08-21 PROCEDURE — 85027 COMPLETE CBC AUTOMATED: CPT | Performed by: INTERNAL MEDICINE

## 2022-08-21 PROCEDURE — 11000001 HC ACUTE MED/SURG PRIVATE ROOM

## 2022-08-21 PROCEDURE — 36415 COLL VENOUS BLD VENIPUNCTURE: CPT | Performed by: INTERNAL MEDICINE

## 2022-08-21 RX ORDER — TALC
6 POWDER (GRAM) TOPICAL NIGHTLY
Status: DISCONTINUED | OUTPATIENT
Start: 2022-08-21 | End: 2022-08-23 | Stop reason: HOSPADM

## 2022-08-21 RX ADMIN — BACLOFEN 10 MG: 10 TABLET ORAL at 08:08

## 2022-08-21 RX ADMIN — GABAPENTIN 300 MG: 300 CAPSULE ORAL at 10:08

## 2022-08-21 RX ADMIN — AZITHROMYCIN MONOHYDRATE 500 MG: 500 INJECTION, POWDER, LYOPHILIZED, FOR SOLUTION INTRAVENOUS at 10:08

## 2022-08-21 RX ADMIN — DONEPEZIL HYDROCHLORIDE 5 MG: 5 TABLET, FILM COATED ORAL at 08:08

## 2022-08-21 RX ADMIN — PANTOPRAZOLE SODIUM 40 MG: 40 TABLET, DELAYED RELEASE ORAL at 10:08

## 2022-08-21 RX ADMIN — VORTIOXETINE 10 MG: 10 TABLET, FILM COATED ORAL at 06:08

## 2022-08-21 RX ADMIN — BACLOFEN 10 MG: 10 TABLET ORAL at 04:08

## 2022-08-21 RX ADMIN — MEMANTINE 5 MG: 5 TABLET ORAL at 08:08

## 2022-08-21 RX ADMIN — TRAZODONE HYDROCHLORIDE 100 MG: 100 TABLET ORAL at 08:08

## 2022-08-21 RX ADMIN — METHOCARBAMOL 500 MG: 500 TABLET ORAL at 11:08

## 2022-08-21 RX ADMIN — DOCUSATE SODIUM 100 MG: 100 CAPSULE, LIQUID FILLED ORAL at 08:08

## 2022-08-21 RX ADMIN — MIDODRINE HYDROCHLORIDE 5 MG: 5 TABLET ORAL at 04:08

## 2022-08-21 RX ADMIN — AMLODIPINE BESYLATE 5 MG: 5 TABLET ORAL at 10:08

## 2022-08-21 RX ADMIN — BACLOFEN 10 MG: 10 TABLET ORAL at 10:08

## 2022-08-21 RX ADMIN — ENOXAPARIN SODIUM 40 MG: 40 INJECTION SUBCUTANEOUS at 04:08

## 2022-08-21 RX ADMIN — OXYBUTYNIN CHLORIDE 5 MG: 5 TABLET ORAL at 05:08

## 2022-08-21 RX ADMIN — MEMANTINE 5 MG: 5 TABLET ORAL at 10:08

## 2022-08-21 RX ADMIN — GABAPENTIN 300 MG: 300 CAPSULE ORAL at 08:08

## 2022-08-21 RX ADMIN — CEFTRIAXONE SODIUM 1 G: 1 INJECTION, POWDER, FOR SOLUTION INTRAMUSCULAR; INTRAVENOUS at 10:08

## 2022-08-21 RX ADMIN — MELATONIN TAB 3 MG 6 MG: 3 TAB at 08:08

## 2022-08-21 RX ADMIN — ACETAMINOPHEN 650 MG: 325 TABLET ORAL at 11:08

## 2022-08-21 NOTE — NURSING
"Pt disclosed @2000 she does not wish to be "DNR" status she would like to be"Full Code". Hospitalist notified to get that changed.   "

## 2022-08-21 NOTE — PT/OT/SLP PROGRESS
Occupational Therapy   Treatment    Name: Susy Foy  MRN: [id number]  Admitting Diagnosis:  Left displaced femoral neck fracture  2 Days Post-Op    Recommendations:     Discharge Recommendations: home with home health, rehabilitation facility  Discharge Equipment Recommendations:  (TBD pending progress c therapy)  Barriers to discharge:  (severity of deficits)    Assessment:     Susy Foy is a 72 y.o. female with a medical diagnosis of Left displaced femoral neck fracture. Performance deficits affecting function are weakness, impaired endurance, impaired self care skills, impaired functional mobility, impaired balance.     Rehab Prognosis:  Good; patient would benefit from acute skilled OT services to address these deficits and reach maximum level of function.       Plan:     Patient to be seen 5 x/week, daily to address the above listed problems via self-care/home management, therapeutic activities, therapeutic exercises  · Plan of Care Expires: 09/02/22  · Plan of Care Reviewed with: patient, family    Subjective     Pain/Comfort:  ·      Objective:     Communicated with: RN prior to session.  Patient found supine with oxygen, PureWick, pulse ox (continuous), peripheral IV, telemetry upon OT entry to room.    General Precautions: Standard, fall   Orthopedic Precautions:RLE weight bearing as tolerated   Braces:    Respiratory Status: Oxymask, flow 4 L/min  Blood Pressure:113/53 prior to mobility; 119/66 with activity     Occupational Performance:     Bed Mobility:    · Patient completed Supine to Sit with minimum assistance     Functional Mobility/Transfers:  · Patient completed Sit <> Stand Transfer with minimum assistance  with  rolling walker   · Patient completed Bed <> Chair Transfer using Step Transfer technique with minimum assistance with rolling walker  · Functional Mobility: Min A to bedside chair and with increased time.      Treatment & Education:  Educated Pt on POC.    Patient left up in  chair with all lines intact and call button in reachEducation:      GOALS:   Multidisciplinary Problems     Occupational Therapy Goals        Problem: Occupational Therapy    Goal Priority Disciplines Outcome Interventions   Occupational Therapy Goal     OT, PT/OT Ongoing, Progressing    Description: Goals to be met by:9/1    Patient will increase functional independence with ADLs by performing:    LE Dressing with Stand-by Assistance.  Grooming while standing at sink with Stand-by Assistance.  Toileting from toilet with Stand-by Assistance for hygiene and clothing management.   Toilet transfer to toilet with Stand-by Assistance.                     Time Tracking:     OT Date of Treatment: 08/21/22  OT Start Time: 0930  OT Stop Time: 1007  OT Total Time (min): 37 min    Billable Minutes:Therapeutic Activity .    OT/QIAN: QIAN     QIAN Visit Number: 1 8/21/2022

## 2022-08-21 NOTE — PROGRESS NOTES
[facility]  Orthopedic Surgery  Progress Note:      Patient Name: Susy Foy  MRN: [id number]  Admission Date: 8/17/2022  Attending Provider:  Juan Jose Medina MD  Hospital Length of Stay:  4 days  Postoperative Day: 2 Days Post-Op      SUBJECTIVE:  Postop day two status post left hip hemiarthroplasty. Patient reports no acute issues overnight and adequate control of pain on current regimen.  Patient worked with physical therapy over the last 24 hours.        OBJECTIVE:    Physical Examination:  General: Well-developed, well-nourished.  Neuro: Alert and oriented x 3.  Psych: Normal mood and affect.  Left lower extremity exam: Dressings clean, dry, and intact with no drainage.  2+ DP pulses and capillary refill less than 3 seconds.  Movement intact at ankle and digits. No calf tenderness.  Sensation intact to light touch. Abduction pillow in place.    Vitals:    08/20/22 2012 08/20/22 2332 08/21/22 0429 08/21/22 0734   BP: 110/65 104/62 125/66 (!) 142/61   Pulse: 62 69 69 74   Resp: 16 18 18 19   Temp: 98.2 °F (36.8 °C) 98.9 °F (37.2 °C) 98.6 °F (37 °C) 99 °F (37.2 °C)   TempSrc: Oral Oral Oral Oral   SpO2: 99% 98% 98% 98%   Weight:       Height:         I/O last 3 completed shifts:  In: 840 [P.O.:840]  Out: 2450 [Urine:2450]    Medications:  Scheduled Meds:   amLODIPine  5 mg Oral Daily    azithromycin (ZITHROMAX) 500 mg IVPB  500 mg Intravenous Q24H    baclofen  10 mg Oral TID    cefTRIAXone (ROCEPHIN) IVPB  1 g Intravenous Q24H    docusate sodium  100 mg Oral QHS    donepeziL  5 mg Oral QHS    enoxaparin  40 mg Subcutaneous Daily    gabapentin  300 mg Oral BID    memantine  5 mg Oral BID    midodrine  5 mg Oral BID WM    ondansetron  4 mg Intravenous Once    oxybutynin  5 mg Oral Daily    pantoprazole  40 mg Oral Daily    traZODone  100 mg Oral QHS    vortioxetine  10 mg Oral Daily     Continuous Infusions:  PRN Meds:.acetaminophen, hydrALAZINE, melatonin, methocarbamoL,  naloxone, ondansetron, sodium chloride 0.9%    Significant Labs: All pertinent labs within the past 24 hours have been reviewed.  Recent Lab Results  (Last 5 results in the past 72 hours)      08/21/22  0508   08/20/22  0637   08/19/22  0918   08/19/22  0745   08/19/22  0540        Albumin/Globulin Ratio 0.8   0.9       0.8       Albumin 2.3   2.3       2.4       Alkaline Phosphatase 96   90       120       ALT 36   49       90       AST 34   48       131       Baso #         0.07       Basophil %         0.7       BILIRUBIN TOTAL 0.4   0.3       0.5       BUN 9.9   12.6       12.7       Calcium 8.2   8.0       8.2       Chloride 107   106       102       CO2 27   24       27       Creatinine 0.69   0.73       0.77       eGFR >60   >60       >60       Eos #         1.01       Eosinophil %         9.6       Globulin, Total 2.9   2.7       2.9       Glucose 76   110       86       Group & Rh     O POS           Hematocrit 26.0   23.9       30.9       Hemoglobin 8.0   7.4       9.7       Hep A IgM       Nonreactive         Hep B C IgM       Nonreactive         Hepatitis C Ab       Nonreactive         Hepatitis B Surface Antigen       Nonreactive         Immature Grans (Abs)         0.05       Immature Granulocytes         0.5       Indirect Srinivas GEL     NEG           Lymph #         1.31       LYMPH %         12.5       MCH 29.2   29.1       29.3       MCHC 30.8   31.0       31.4       MCV 94.9   94.1       93.4       Mono #         0.61       Mono %         5.8       MPV 10.9   10.8       9.7       Neut #         7.4       Neut %         70.9       nRBC 0.0   0.0       0.0       Pathology Review       No serological evidence of recent or past hepatitis A, B, or C infection.  Rajan Iverson MD           Platelets 177   123       158       Potassium 4.0   4.0       3.5       PROTEIN TOTAL 5.2   5.0       5.3       RBC 2.74   2.54       3.31       RDW 14.7   14.2       14.5       Sodium 141   139       136       WBC  10.2   11.4       10.5                            Significant Imaging:  I have reviewed all pertinent imaging results/findings.  X-Ray Hip 2 or 3 views Left (with Pelvis when performed)    Result Date: 8/19/2022  EXAMINATION: XR HIP WITH PELVIS WHEN PERFORMED, 2 OR 3 VIEWS LEFT CLINICAL HISTORY: post op; COMPARISON: None. FINDINGS: Left hip prostheses in good anatomic alignment and position only 1 projection provided No acute displaced fractures or dislocations. Joint spaces preserved. No blastic or lytic lesions. Soft tissues within normal limits.     Left hip prostheses. Electronically signed by: Emiliano Esteban Date:    08/19/2022 Time:    13:06    X-Ray Hip 2 or 3 views Left (with Pelvis when performed)    Result Date: 8/18/2022  EXAMINATION: COMPLETE LEFT HIP AND PELVIS X-RAYS: CPT: 40519, 94210 CLINICAL HISTORY: hip pain; FINDINGS: Displaced subcapital fracture of the left hip with no other definite fractures or dislocations identified     Fracture as above Electronically signed by: Emiliano Esteban Date:    08/18/2022 Time:    08:47    X-Ray Chest AP Portable    Result Date: 8/18/2022  EXAMINATION: XR CHEST AP PORTABLE CLINICAL HISTORY: shortness of breath; TECHNIQUE: Single view of the chest COMPARISON: No prior imaging available for comparison. FINDINGS: No focal opacification, pleural effusion, or pneumothorax. The cardiomediastinal silhouette is within normal limits. No acute osseous abnormality.     No acute cardiopulmonary process.  Prominent interstitial markings with no focal opacification. Electronically signed by: Javier Lerner Date:    08/18/2022 Time:    07:08    US Abdomen Limited    Result Date: 8/18/2022  EXAMINATION: US ABDOMEN LIMITED CLINICAL HISTORY: Transaminitis; TECHNIQUE: Limited ultrasound of the right upper quadrant of the abdomen was performed. COMPARISON: None FINDINGS: LIMITATIONS: Exam limited due to poor acoustic window related to shadowing bowel gas and/or body habitus.  LIVER: 15 cm cranial caudal at the midclavicular line. Heterogeneous echotexture.  Portal vein is patent with appropriate directional flow. PANCREAS: Obscured by overlying bowel gas. GALLBLADDER: The gallbladder is surgically absent. BILE DUCTS: 9 mm common bile duct.  Distal common bile duct is obscured by shadowing bowel gas. INFERIOR VENA CAVA: Obscured by shadowing RIGHT KIDNEY: Suboptimally visualized.  Length estimated at 9 cm. No hydronephrosis. OTHER: No ascites.  Aorta is obscured by shadowing bowel gas.     Technically limited exam due to acoustic shadowing. The gallbladder is not seen and is reportedly surgically absent.  There is age indeterminate ectasia of the common bile duct. Heterogeneous echotexture of the liver may be related to steatosis, hepatocellular disease or infiltrative lesions.  This is poorly characterized on the current exam due to poor acoustic window. Electronically signed by: Aga Carrero Date:    08/18/2022 Time:    10:51    X-Ray Hip 2 or 3 views Left (with Pelvis when performed)  Narrative: EXAMINATION:  XR HIP WITH PELVIS WHEN PERFORMED, 2 OR 3 VIEWS LEFT    CLINICAL HISTORY:  post op;    COMPARISON:  None.    FINDINGS:  Left hip prostheses in good anatomic alignment and position only 1 projection provided    No acute displaced fractures or dislocations.    Joint spaces preserved.    No blastic or lytic lesions.    Soft tissues within normal limits.  Impression: Left hip prostheses.    Electronically signed by: Emiliano Esteban  Date:    08/19/2022  Time:    13:06         ASSESSMENT/PLAN: 72 y.o. female 2 Days Post-Op s/p left hip hemiarthroplasty  -H&H 8.0/26.0 this morning, continue monitoring and defer management to primary team  -Continue with current pain control regimen  -Continue with current physical therapy and weight-bearing as tolerated to the left lower extremity, full range of motion.  -Continue with DVT prophylaxis  -Disposition pending      This note was  created with the assistance of voice recognition software or phone dictation.  There may be transcription errors as a result of using this technology however minimal. Effort has been made to assure accuracy of transcription but any obvious errors or omissions should be clarified with the author of the document.

## 2022-08-21 NOTE — PROGRESS NOTES
[facility]  Hospital Medicine Progress Note        Chief Complaint: Inpatient Follow-up for left hip fracture due to fall at home     HPI: 72-year-old female with known past medical history of hypertension, hyperlipidemia, HIV positive on cabenuva with HIV viral load undetectable, dementia, admitted 08/18/2022 after cleaning up for on 08/17/2022 at her home falling on her left hip, patient reports he did not hit her head.  Complained of pain in the left hip as well.  Patient reports she was able to get up from the fall but was unable to bear weight on that leg.   Patient and her sister lives together, sister is at bedside informed that there just recovering from COVID infection 2 weeks ago, did not needed to be admitted to the hospital, was treated outpatient with antibiotic by their PCP.  They are both vaccinated and boosted.  Patient still complains of nonproductive cough.  Denies chest pain shortness of breath fever abdominal pain nausea vomiting diarrhea or constipation.  No urinary symptoms as well.  Patient reports at home she usually walks with a walker and does not need assistance with activities of daily living   In the ER patient was noted to have lower oxygen saturation 91% so she was started on supplemental oxygen via nasal cannula.  Mild leukocytosis of 13.4, liver enzymes were normal yesterday but this morning it has significantly increased.  COVID rapid was negative.  X-ray chest without acute pulmonary processes but prominent interstitial marking without focal opacification.  Hip x-ray confirmed left hip fracture.   Discussed orthopedic has been consulted, will keep her NPO till the evaluate for probable surgery today.  Verbalized understanding   Patient underwent left hip osiel arthroplasty on 08/19 with Dr. Gomez.  Pt started physical therapy postop.  Noted postop acute anemia but starting to trend up without transfusion  PT OT recommended Home with HH if sister can  provide all the help but pt and sister both agreed it will be difficult at home so requested rehab before going home   CM on board       Interval Hx:   Siting in bed starting to eat her breakfast.  For feeling good.  Discussed plan for rehab once accepted   Did complain of sleep disturbance, reports insomnia and being on trazodone at home but has not been helping at home either.  Informed patient that I can add melatonin but sleep medication has to be adjusted by primary care physician.  Verbalized understanding  Afebrile in the last 24 hours  Case was discussed with patient nurse on the floor    Objective/physical exam:  General:   sitting in bed  Chest: Clear to auscultation bilaterally anteriorly   Heart: S1, S2, no appreciable murmur   Abdomen: Soft, nontender, BS +   MSK:  Dressing left leg  Neurologic: Alert and oriented x4, cranial nerves 2-12 grossly intact.  Answered all the questions appropriately     VITAL SIGNS: 24 HRS MIN & MAX LAST   Temp  Min: 97.8 °F (36.6 °C)  Max: 98.9 °F (37.2 °C) 98.6 °F (37 °C)   BP  Min: 104/62  Max: 125/66 125/66   Pulse  Min: 52  Max: 69  69   Resp  Min: 16  Max: 20 18   SpO2  Min: 96 %  Max: 100 % 98 %       Recent Labs   Lab 08/19/22  0540 08/20/22  0637 08/21/22  0508   WBC 10.5 11.4 10.2   RBC 3.31* 2.54* 2.74*   HGB 9.7* 7.4* 8.0*   HCT 30.9* 23.9* 26.0*   MCV 93.4 94.1* 94.9*   MCH 29.3 29.1 29.2   MCHC 31.4* 31.0* 30.8*   RDW 14.5 14.2 14.7    123* 177   MPV 9.7 10.8* 10.9*       Recent Labs   Lab 08/18/22  0459 08/18/22  1114 08/19/22  0540 08/20/22  0637 08/21/22  0508      < > 136 139 141   K 4.9   < > 3.5 4.0 4.0   CO2 17*   < > 27 24 27   BUN 18.6   < > 12.7 12.6 9.9   CREATININE 1.41*   < > 0.77 0.73 0.69   CALCIUM 9.1   < > 8.2* 8.0* 8.2*   MG 2.00  --   --   --   --    ALBUMIN 3.2*   < > 2.4* 2.3* 2.3*   ALKPHOS 161*   < > 120 90 96   *   < > 90* 49 36   *   < > 131* 48* 34   BILITOT 0.9   < > 0.5 0.3 0.4    < > = values in this  interval not displayed.          Microbiology Results (last 7 days)     Procedure Component Value Units Date/Time    Blood Culture [[id number]] Collected: 08/19/22 0823    Order Status: Resulted Specimen: Blood from Wrist, Right Updated: 08/19/22 0832           See below for Radiology    Scheduled Med:   amLODIPine  5 mg Oral Daily    azithromycin (ZITHROMAX) 500 mg IVPB  500 mg Intravenous Q24H    baclofen  10 mg Oral TID    cefTRIAXone (ROCEPHIN) IVPB  1 g Intravenous Q24H    docusate sodium  100 mg Oral QHS    donepeziL  5 mg Oral QHS    enoxaparin  40 mg Subcutaneous Daily    gabapentin  300 mg Oral BID    memantine  5 mg Oral BID    midodrine  5 mg Oral BID WM    ondansetron  4 mg Intravenous Once    oxybutynin  5 mg Oral Daily    pantoprazole  40 mg Oral Daily    traZODone  100 mg Oral QHS    vortioxetine  10 mg Oral Daily        Continuous Infusions:       PRN Meds:  acetaminophen, hydrALAZINE, melatonin, methocarbamoL, naloxone, ondansetron, sodium chloride 0.9%       Assessment/Plan:  Left hip fracture secondary to ground level fall at home  s/p L hip hemiarthoplasty 8/19  Postop anemia- improving without transfusion   ? Community-acquired pneumonia vs residual from recent covid infection   Acute kidney injury- resolved    Transaminitis - resolved   Essential hypertension   HIV positive on Cabenuva q monthly- viral load undetected           Admitted as inpatient on 8/18   Ortho on board, status post left hip hemiarthroplasty on 08/19  Postop day 2  Noted postop anemia with hemoglobin dropped to 7.6, will monitor, will transfuse if less than 7 or if patient becomes symptomatic  Hb improved to 8.0 without transfusion, cont monitoring   IV Morphine for pain, noted pt allergic to Codeine, gives her palpitations and chest heaviness    Continue with Rocephin and azithromycin empirically- day 4 of 5  8/19- Noted 1 fever spike of 101.5, order blood cultures x2, the only 1 culture was collected  Blood  cx prelim is negative x 24 hrs   Right upper quadrant ultrasound ordered for elevated liver enzymes --> Heterogeneous echotexture of the liver may be related to steatosis, hepatocellular disease or infiltrative lesions.  This is poorly characterized on the current exam due to poor acoustic window   Acute hep panel --> nonreactive  Liver enzymes normalized  D/C IVF as patient consuming orally  IV hydralazine as needed for hypertension   Resumed appropriate home medication for chronic medical condition  Added Melatonin 6mg nightly as pt reported unable to sleep   Hold antihypertensive medication it is if systolic blood pressure less than 110  Continue midodrine  I will hold Statin now and resume upon discharge   PT OT consulted postop recommended rehab/ out pt therapy but pt more comfortable with inpatient rehab  Morning h/h ordered       VTE Prophylaxis: Lovenox for DVT prophylaxis and will be advised to be as mobile as possible and sit in a chair as tolerated       Patient condition:  Stable        Discharge Planning and Disposition/Anticipated discharge:  Rehab once accepted       All diagnosis and differential diagnosis have been reviewed; assessment and plan has been documented; I have personally reviewed the labs and test results that are presently available; I have reviewed the patients medication list; I have reviewed the consulting providers response and recommendations. I have reviewed or attempted to review medical records based upon their availability    All of the patient's questions have been  addressed and answered. Patient's is agreeable to the above stated plan. I will continue to monitor closely and make adjustments to medical management as needed.  _____________________________________________________________________    Nutrition Status:    Radiology:  X-Ray Hip 2 or 3 views Left (with Pelvis when performed)  Narrative: EXAMINATION:  XR HIP WITH PELVIS WHEN PERFORMED, 2 OR 3 VIEWS LEFT    CLINICAL  HISTORY:  post op;    COMPARISON:  None.    FINDINGS:  Left hip prostheses in good anatomic alignment and position only 1 projection provided    No acute displaced fractures or dislocations.    Joint spaces preserved.    No blastic or lytic lesions.    Soft tissues within normal limits.  Impression: Left hip prostheses.    Electronically signed by: Emiliano Esteban  Date:    08/19/2022  Time:    13:06      Juan Jose Medina MD   08/21/2022

## 2022-08-22 LAB
ABO + RH BLD: NORMAL
BLD PROD TYP BPU: NORMAL
BLOOD UNIT EXPIRATION DATE: NORMAL
BLOOD UNIT TYPE CODE: 5100
CROSSMATCH INTERPRETATION: NORMAL
DISPENSE STATUS: NORMAL
ERYTHROCYTE [DISTWIDTH] IN BLOOD BY AUTOMATED COUNT: 14.7 % (ref 11.5–17)
HCT VFR BLD AUTO: 23.7 % (ref 37–47)
HGB BLD-MCNC: 7.2 GM/DL (ref 12–16)
MCH RBC QN AUTO: 29 PG (ref 27–31)
MCHC RBC AUTO-ENTMCNC: 30.4 MG/DL (ref 33–36)
MCV RBC AUTO: 95.6 FL (ref 80–94)
NRBC BLD AUTO-RTO: 0 %
PLATELET # BLD AUTO: 205 X10(3)/MCL (ref 130–400)
PMV BLD AUTO: 9.9 FL (ref 7.4–10.4)
RBC # BLD AUTO: 2.48 X10(6)/MCL (ref 4.2–5.4)
UNIT NUMBER: NORMAL
WBC # SPEC AUTO: 8.1 X10(3)/MCL (ref 4.5–11.5)

## 2022-08-22 PROCEDURE — 36415 COLL VENOUS BLD VENIPUNCTURE: CPT | Performed by: INTERNAL MEDICINE

## 2022-08-22 PROCEDURE — 97110 THERAPEUTIC EXERCISES: CPT

## 2022-08-22 PROCEDURE — 36430 TRANSFUSION BLD/BLD COMPNT: CPT

## 2022-08-22 PROCEDURE — 27000221 HC OXYGEN, UP TO 24 HOURS

## 2022-08-22 PROCEDURE — 63600175 PHARM REV CODE 636 W HCPCS: Performed by: NURSE PRACTITIONER

## 2022-08-22 PROCEDURE — 25000003 PHARM REV CODE 250: Performed by: NURSE PRACTITIONER

## 2022-08-22 PROCEDURE — 11000001 HC ACUTE MED/SURG PRIVATE ROOM

## 2022-08-22 PROCEDURE — P9016 RBC LEUKOCYTES REDUCED: HCPCS | Performed by: INTERNAL MEDICINE

## 2022-08-22 PROCEDURE — 97530 THERAPEUTIC ACTIVITIES: CPT

## 2022-08-22 PROCEDURE — 99900031 HC PATIENT EDUCATION (STAT)

## 2022-08-22 PROCEDURE — 85027 COMPLETE CBC AUTOMATED: CPT | Performed by: INTERNAL MEDICINE

## 2022-08-22 PROCEDURE — 25000003 PHARM REV CODE 250: Performed by: INTERNAL MEDICINE

## 2022-08-22 PROCEDURE — 94799 UNLISTED PULMONARY SVC/PX: CPT

## 2022-08-22 RX ORDER — POLYETHYLENE GLYCOL 3350 17 G/17G
17 POWDER, FOR SOLUTION ORAL DAILY
Status: DISCONTINUED | OUTPATIENT
Start: 2022-08-22 | End: 2022-08-23 | Stop reason: HOSPADM

## 2022-08-22 RX ORDER — BISACODYL 10 MG
10 SUPPOSITORY, RECTAL RECTAL DAILY PRN
Status: DISCONTINUED | OUTPATIENT
Start: 2022-08-22 | End: 2022-08-23 | Stop reason: HOSPADM

## 2022-08-22 RX ORDER — HYDROCODONE BITARTRATE AND ACETAMINOPHEN 500; 5 MG/1; MG/1
TABLET ORAL
Status: DISCONTINUED | OUTPATIENT
Start: 2022-08-22 | End: 2022-08-23 | Stop reason: HOSPADM

## 2022-08-22 RX ORDER — ADHESIVE BANDAGE
30 BANDAGE TOPICAL DAILY PRN
Status: DISCONTINUED | OUTPATIENT
Start: 2022-08-22 | End: 2022-08-23 | Stop reason: HOSPADM

## 2022-08-22 RX ADMIN — PANTOPRAZOLE SODIUM 40 MG: 40 TABLET, DELAYED RELEASE ORAL at 08:08

## 2022-08-22 RX ADMIN — MAGNESIUM HYDROXIDE 2400 MG: 400 SUSPENSION ORAL at 01:08

## 2022-08-22 RX ADMIN — GABAPENTIN 300 MG: 300 CAPSULE ORAL at 08:08

## 2022-08-22 RX ADMIN — CEFTRIAXONE SODIUM 1 G: 1 INJECTION, POWDER, FOR SOLUTION INTRAMUSCULAR; INTRAVENOUS at 10:08

## 2022-08-22 RX ADMIN — POLYETHYLENE GLYCOL 3350 17 G: 17 POWDER, FOR SOLUTION ORAL at 01:08

## 2022-08-22 RX ADMIN — ACETAMINOPHEN 650 MG: 325 TABLET ORAL at 08:08

## 2022-08-22 RX ADMIN — BISACODYL 10 MG: 10 SUPPOSITORY RECTAL at 05:08

## 2022-08-22 RX ADMIN — BACLOFEN 10 MG: 10 TABLET ORAL at 02:08

## 2022-08-22 RX ADMIN — MIDODRINE HYDROCHLORIDE 5 MG: 5 TABLET ORAL at 04:08

## 2022-08-22 RX ADMIN — MIDODRINE HYDROCHLORIDE 5 MG: 5 TABLET ORAL at 08:08

## 2022-08-22 RX ADMIN — TRAZODONE HYDROCHLORIDE 100 MG: 100 TABLET ORAL at 08:08

## 2022-08-22 RX ADMIN — MEMANTINE 5 MG: 5 TABLET ORAL at 08:08

## 2022-08-22 RX ADMIN — BACLOFEN 10 MG: 10 TABLET ORAL at 08:08

## 2022-08-22 RX ADMIN — DONEPEZIL HYDROCHLORIDE 5 MG: 5 TABLET, FILM COATED ORAL at 08:08

## 2022-08-22 RX ADMIN — DOCUSATE SODIUM 100 MG: 100 CAPSULE, LIQUID FILLED ORAL at 08:08

## 2022-08-22 RX ADMIN — ENOXAPARIN SODIUM 40 MG: 40 INJECTION SUBCUTANEOUS at 04:08

## 2022-08-22 RX ADMIN — AZITHROMYCIN MONOHYDRATE 500 MG: 500 INJECTION, POWDER, LYOPHILIZED, FOR SOLUTION INTRAVENOUS at 10:08

## 2022-08-22 RX ADMIN — MELATONIN TAB 3 MG 6 MG: 3 TAB at 08:08

## 2022-08-22 RX ADMIN — OXYBUTYNIN CHLORIDE 5 MG: 5 TABLET ORAL at 05:08

## 2022-08-22 NOTE — PLAN OF CARE
Spoke with Leonel at [facility] in rehab.She will review info and update me regarding referral. Updates sent today through [facility]

## 2022-08-22 NOTE — PROGRESS NOTES
Patient Name: Susy Foy  MRN: [id number]  Admission Date: 8/17/2022  Hospital Length of Stay: 5 days  Attending Provider: Juan Jose Medina MD  Primary Care Provider: Primary Doctor No  Follow-up For: Procedure(s) (LRB):  HEMIARTHROPLASTY, HIP (Left)    Post-Operative Day: 3 Days Post-Op  Subjective:       Principal Orthopedic Problem: 3 Days Post-Op s/p L hip hemiarthroplasty    Interval History:  No acute issues over the weekend.  Patient resting comfortably this morning.  She states that she is awaiting rehab placement, possibly today.  Her pain has been well controlled.  She has no new complaints.    Review of patient's allergies indicates:   Allergen Reactions    Codeine        Current Facility-Administered Medications   Medication    0.9%  NaCl infusion (for blood administration)    acetaminophen tablet 650 mg    amLODIPine tablet 5 mg    azithromycin (ZITHROMAX) 500 mg in dextrose 5 % 250 mL IVPB    baclofen tablet 10 mg    cefTRIAXone (ROCEPHIN) 1 g in dextrose 5 % in water (D5W) 5 % 50 mL IVPB (MB+)    docusate sodium capsule 100 mg    donepeziL tablet 5 mg    enoxaparin injection 40 mg    gabapentin capsule 300 mg    hydrALAZINE injection 10 mg    melatonin tablet 6 mg    memantine tablet 5 mg    methocarbamoL tablet 500 mg    midodrine tablet 5 mg    naloxone 0.4 mg/mL injection 0.02 mg    ondansetron injection 4 mg    ondansetron injection 4 mg    oxybutynin tablet 5 mg    pantoprazole EC tablet 40 mg    sodium chloride 0.9% flush 10 mL    traZODone tablet 100 mg    vortioxetine Tab 10 mg     Objective:     Vital Signs (Most Recent):  Temp: 99.3 °F (37.4 °C) (08/22/22 0736)  Pulse: 74 (08/22/22 0736)  Resp: 18 (08/22/22 0736)  BP: 127/71 (08/22/22 0736)  SpO2: 96 % (08/22/22 0736) Vital Signs (24h Range):  Temp:  [98.6 °F (37 °C)-99.6 °F (37.6 °C)] 99.3 °F (37.4 °C)  Pulse:  [66-79] 74  Resp:  [18-20] 18  SpO2:  [96 %-99 %] 96 %  BP: (108-148)/(58-74) 127/71     Weight: 77.1 kg  "(169 lb 15.6 oz)  Height: 5' 5" (165.1 cm)  Body mass index is 28.29 kg/m².      Intake/Output Summary (Last 24 hours) at 8/22/2022 1012  Last data filed at 8/21/2022 2329  Gross per 24 hour   Intake 540 ml   Output 1300 ml   Net -760 ml       Physical Exam:   Ortho/SPM Exam    General the patient is alert and oriented x3 no acute distress nontoxic-appearing appropriate affect.    Constitutional: Vital signs are examined and stable.  Resp: No signs of labored breathing    Musculoskeletal Exam:  Left lower extremity:  Surgical incision clean and dry.  New bandage in place.  Thigh soft and compressible.  Soreness with internal external rotation of the left hip as expected.  5/5 motor strength dorsiflexion plantar flexion of the ankle and digits.  Sensation light touch intact distally.      Diagnostic Findings:   Significant Labs: All pertinent labs within the past 24 hours have been reviewed.  Recent Lab Results       08/22/22  0530        Hematocrit 23.7       Hemoglobin 7.2       MCH 29.0       MCHC 30.4       MCV 95.6       MPV 9.9       nRBC 0.0       Platelets 205       RBC 2.48       RDW 14.7       WBC 8.1              Significant Imaging: I have reviewed and interpreted all pertinent imaging results/findings.     Assessment/Plan:     Active Diagnoses:    Diagnosis Date Noted POA    PRINCIPAL PROBLEM:  Left displaced femoral neck fracture [S72.002A] 08/18/2022 Yes      Problems Resolved During this Admission:   Her H/H has been stable.  She is asymptomatic currently with stable vitals.  Okay from an orthopedic standpoint for discharge to rehab.  She will follow up with me in 3 weeks in the office for evaluation of her incision.  Please call with any questions or concerns.  We will continue to monitor weekly if she is still in house.  The patient and her family understand and agree with our plan all questions and concerns were addressed.        Florentin Gomez MD  Orthopedic Trauma Surgery  [facility] - Ortho Neuro

## 2022-08-22 NOTE — PLAN OF CARE
Leonel with [facility] inpt rehab called to clarify that pt was on perwick, oxygen and that she has not had BM since surgery. Nursing aware  Leonel requeted vitals. Labs and Mar faxed to her, this done.     Leonel sent  Communication at 1431 that pt is approved for admission tomorrow but they want her to have a BM. Nursing aware  9098 Nursing updates no BM yet. Leonel updated.  Will follow up tomorrow

## 2022-08-22 NOTE — PT/OT/SLP PROGRESS
Physical Therapy Treatment    Patient Name:  Susy Foy   MRN:  [id number]    Recommendations:     Discharge Recommendations:  rehabilitation facility   Discharge Equipment Recommendations: walker, rolling   Barriers to discharge: acuity of illness    Assessment:     Susy Foy is a 72 y.o. female admitted with a medical diagnosis of Left displaced femoral neck fracture.  She presents with the following impairments/functional limitations:  weakness, impaired endurance, impaired functional mobility, gait instability, impaired balance, decreased lower extremity function, pain, decreased ROM, orthopedic precautions .    Rehab Prognosis: Good; patient would benefit from acute skilled PT services to address these deficits and reach maximum level of function.    Recent Surgery: Procedure(s) (LRB):  HEMIARTHROPLASTY, HIP (Left) 3 Days Post-Op    Plan:     During this hospitalization, patient to be seen daily to BID to address the identified rehab impairments via gait training, therapeutic activities, therapeutic exercises, neuromuscular re-education and progress toward the following goals:    · Plan of Care Expires:  09/21/22    Subjective     Chief Complaint: pain  Patient/Family Comments/goals: to get stronger  Pain/Comfort:  · Pain Rating 1: 8/10  · Location - Side 1: Left  · Location 1: hip  · Pain Addressed 1: Nurse notified      Objective:     Communicated with RN prior to session.  Patient found HOB elevated with PureWick, peripheral IV, pressure relief boots upon PT entry to room.     General Precautions: Standard, fall   Orthopedic Precautions:LLE weight bearing as tolerated (L hip pxns)   Braces: N/A  Respiratory Status: oxymask, 4L     Functional Mobility:  · Bed Mobility:     · Supine to Sit: moderate assistance  · Transfers:     · Sit to Stand:  minimum assistance with rolling walker  · Gait: pt demo'd a very slow step to gt pattern w/ Sandi/modA for balance and use of RW x 15 ft.       AM-PAC 6  CLICK MOBILITY  Turning over in bed (including adjusting bedclothes, sheets and blankets)?: 3  Sitting down on and standing up from a chair with arms (e.g., wheelchair, bedside commode, etc.): 3  Moving from lying on back to sitting on the side of the bed?: 3  Moving to and from a bed to a chair (including a wheelchair)?: 3  Need to walk in hospital room?: 2  Climbing 3-5 steps with a railing?: 2  Basic Mobility Total Score: 16       Exercises:  Pt performed 10 reps of L LE quad sets, heel slides, hip abd,LAQ, ankle pumps and glut sets.    Patient left up in chair with all lines intact, call button in reach, RN notified and DIL present..    GOALS:   Multidisciplinary Problems     Physical Therapy Goals        Problem: Physical Therapy    Goal Priority Disciplines Outcome Goal Variances Interventions   Physical Therapy Goal     PT, PT/OT Ongoing, Progressing     Description: Goals to be met by: 22    Patient will increase functional independence with mobility by performin. Supine to sit with Stand-by Assistance  2. Sit to supine with Stand-by Assistance  3. Sit to stand transfer with Stand-by Assistance  4. Gait  x 150 feet with Stand-by Assistance using Rolling Walker.                      Time Tracking:     PT Received On: 22  PT Start Time: 1323     PT Stop Time: 1348  PT Total Time (min): 25 min     Billable Minutes: Therapeutic Activity 15 mins and Therapeutic Exercise 10 mins    Treatment Type: Treatment  PT/PTA: PT     PTA Visit Number: 2022

## 2022-08-22 NOTE — PT/OT/SLP PROGRESS
Physical Therapy      Patient Name:  Susy Foy   MRN:  [id number]    Patient not seen today secondary to pt w/ low H&H in need of blood transfusion that has not yet started. Will follow-up this PM as schedule permits.

## 2022-08-22 NOTE — PROGRESS NOTES
[facility]  Hospital Medicine Progress Note        Chief Complaint: Inpatient Follow-up for left hip fracture due to fall at home     HPI: 72-year-old female with known past medical history of hypertension, hyperlipidemia, HIV positive on cabenuva with HIV viral load undetectable, dementia, admitted 08/18/2022 after cleaning up for on 08/17/2022 at her home falling on her left hip, patient reports he did not hit her head.  Complained of pain in the left hip as well.  Patient reports she was able to get up from the fall but was unable to bear weight on that leg.   Patient and her sister lives together, sister is at bedside informed that there just recovering from COVID infection 2 weeks ago, did not needed to be admitted to the hospital, was treated outpatient with antibiotic by their PCP.  They are both vaccinated and boosted.  Patient still complains of nonproductive cough.  Denies chest pain shortness of breath fever abdominal pain nausea vomiting diarrhea or constipation.  No urinary symptoms as well.  Patient reports at home she usually walks with a walker and does not need assistance with activities of daily living   In the ER patient was noted to have lower oxygen saturation 91% so she was started on supplemental oxygen via nasal cannula.  Mild leukocytosis of 13.4, liver enzymes were normal yesterday but this morning it has significantly increased.  COVID rapid was negative.  X-ray chest without acute pulmonary processes but prominent interstitial marking without focal opacification.  Hip x-ray confirmed left hip fracture.   Discussed orthopedic has been consulted, will keep her NPO till the evaluate for probable surgery today.  Verbalized understanding   Patient underwent left hip osiel arthroplasty on 08/19 with Dr. Gomez.  Pt started physical therapy postop.  Noted postop acute anemia but starting to trend up without transfusion  PT OT recommended Home with HH if sister can  provide all the help but pt and sister both agreed it will be difficult at home so requested rehab before going home   CM on board       Interval Hx:   Siting in bed, undergoing blood transfusion .  No complaints.  Stated she worked with physical therapy yesterday and could not do this morning given undergoing blood transfusion.    Afebrile in the last 24 hours  Case was discussed with patient nurse asked less our  on the floor    Objective/physical exam:  General:   sitting in bed  Chest: Clear to auscultation bilaterally anteriorly   Heart: S1, S2, no appreciable murmur   Abdomen: Soft, nontender, BS +   MSK:  Dressing left leg  Neurologic: Alert and oriented x4, cranial nerves 2-12 grossly intact.  Answered all the questions appropriately     VITAL SIGNS: 24 HRS MIN & MAX LAST   Temp  Min: 98.6 °F (37 °C)  Max: 99.6 °F (37.6 °C) 99.1 °F (37.3 °C)   BP  Min: 108/58  Max: 148/67 (!) 148/67   Pulse  Min: 66  Max: 79  75   Resp  Min: 18  Max: 20 18   SpO2  Min: 97 %  Max: 99 % 98 %       Recent Labs   Lab 08/20/22  0637 08/21/22  0508 08/22/22  0530   WBC 11.4 10.2 8.1   RBC 2.54* 2.74* 2.48*   HGB 7.4* 8.0* 7.2*   HCT 23.9* 26.0* 23.7*   MCV 94.1* 94.9* 95.6*   MCH 29.1 29.2 29.0   MCHC 31.0* 30.8* 30.4*   RDW 14.2 14.7 14.7   * 177 205   MPV 10.8* 10.9* 9.9       Recent Labs   Lab 08/18/22  0459 08/18/22  1114 08/19/22  0540 08/20/22  0637 08/21/22  0508      < > 136 139 141   K 4.9   < > 3.5 4.0 4.0   CO2 17*   < > 27 24 27   BUN 18.6   < > 12.7 12.6 9.9   CREATININE 1.41*   < > 0.77 0.73 0.69   CALCIUM 9.1   < > 8.2* 8.0* 8.2*   MG 2.00  --   --   --   --    ALBUMIN 3.2*   < > 2.4* 2.3* 2.3*   ALKPHOS 161*   < > 120 90 96   *   < > 90* 49 36   *   < > 131* 48* 34   BILITOT 0.9   < > 0.5 0.3 0.4    < > = values in this interval not displayed.          Microbiology Results (last 7 days)     Procedure Component Value Units Date/Time    Blood Culture [[id number]] Collected:  08/19/22 0823    Order Status: Resulted Specimen: Blood from Wrist, Right Updated: 08/19/22 0832           See below for Radiology    Scheduled Med:   amLODIPine  5 mg Oral Daily    azithromycin (ZITHROMAX) 500 mg IVPB  500 mg Intravenous Q24H    baclofen  10 mg Oral TID    cefTRIAXone (ROCEPHIN) IVPB  1 g Intravenous Q24H    docusate sodium  100 mg Oral QHS    donepeziL  5 mg Oral QHS    enoxaparin  40 mg Subcutaneous Daily    gabapentin  300 mg Oral BID    melatonin  6 mg Oral QHS    memantine  5 mg Oral BID    midodrine  5 mg Oral BID WM    ondansetron  4 mg Intravenous Once    oxybutynin  5 mg Oral Daily    pantoprazole  40 mg Oral Daily    traZODone  100 mg Oral QHS    vortioxetine  10 mg Oral Daily        Continuous Infusions:       PRN Meds:  acetaminophen, hydrALAZINE, methocarbamoL, naloxone, ondansetron, sodium chloride 0.9%       Assessment/Plan:  Left hip fracture secondary to ground level fall at home  s/p L hip hemiarthoplasty 8/19  Postop anemia- requiring transfusion  ? Community-acquired pneumonia vs residual from recent covid infection - treated  Acute kidney injury- resolved    Transaminitis - resolved   Essential hypertension   HIV positive on Cabenuva q monthly- viral load undetected           Admitted as inpatient on 8/18   Ortho on board, status post left hip hemiarthroplasty on 08/19  Postop day 3  Noted postop anemia with hemoglobin dropped to 7.6, --> 8.0--> 7.2  Ordered 1 unit of packed red blood cell transfusion today  IV Morphine for pain, noted pt allergic to Codeine, gives her palpitations and chest heaviness    Continue with Rocephin and azithromycin empirically- day 5 of 5- will be completed today  8/19- 1 fever spike of 101.5, order blood cultures --> negative to date  Right upper quadrant ultrasound ordered for elevated liver enzymes --> Heterogeneous echotexture of the liver may be related to steatosis, hepatocellular disease or infiltrative lesions.  This is  poorly characterized on the current exam due to poor acoustic window   Acute hep panel --> nonreactive  Liver enzymes normalized  D/C IVF as patient consuming orally  IV hydralazine as needed for hypertension   Resumed appropriate home medication for chronic medical condition  Added Melatonin 6mg nightly as pt reported unable to sleep   Hold antihypertensive medication it is if systolic blood pressure less than 110  Continue midodrine  I will hold Statin now and resume upon discharge   PT OT consulted postop recommended rehab/ out pt therapy but pt more comfortable with inpatient rehab  Morning h/h, cmp  ordered       VTE Prophylaxis: Lovenox for DVT prophylaxis and will be advised to be as mobile as possible and sit in a chair as tolerated       Patient condition:  Stable        Discharge Planning and Disposition/Anticipated discharge:  Rehab once accepted     Critical care note:  Critical care diagnosis: acute post op anemia requiring blood transfusion   Critical care interventions: Hands-on evaluation, review of labs/radiographs/records and discussion with patient and family if present  Critical care time spent: 35 minutes      All diagnosis and differential diagnosis have been reviewed; assessment and plan has been documented; I have personally reviewed the labs and test results that are presently available; I have reviewed the patients medication list; I have reviewed the consulting providers response and recommendations. I have reviewed or attempted to review medical records based upon their availability    All of the patient's questions have been  addressed and answered. Patient's is agreeable to the above stated plan. I will continue to monitor closely and make adjustments to medical management as needed.  _____________________________________________________________________    Nutrition Status:    Radiology:  X-Ray Hip 2 or 3 views Left (with Pelvis when performed)  Narrative: EXAMINATION:  XR HIP WITH  PELVIS WHEN PERFORMED, 2 OR 3 VIEWS LEFT    CLINICAL HISTORY:  post op;    COMPARISON:  None.    FINDINGS:  Left hip prostheses in good anatomic alignment and position only 1 projection provided    No acute displaced fractures or dislocations.    Joint spaces preserved.    No blastic or lytic lesions.    Soft tissues within normal limits.  Impression: Left hip prostheses.    Electronically signed by: Emiliano Esteban  Date:    08/19/2022  Time:    13:06      Juan Jose Medina MD   08/22/2022

## 2022-08-23 VITALS
HEART RATE: 72 BPM | WEIGHT: 170 LBS | RESPIRATION RATE: 17 BRPM | OXYGEN SATURATION: 90 % | TEMPERATURE: 98 F | DIASTOLIC BLOOD PRESSURE: 68 MMHG | SYSTOLIC BLOOD PRESSURE: 107 MMHG | BODY MASS INDEX: 28.32 KG/M2 | HEIGHT: 65 IN

## 2022-08-23 PROBLEM — S72.002A LEFT DISPLACED FEMORAL NECK FRACTURE: Status: RESOLVED | Noted: 2022-08-18 | Resolved: 2022-08-23

## 2022-08-23 PROBLEM — D64.9 POSTOPERATIVE ANEMIA: Status: ACTIVE | Noted: 2022-08-23

## 2022-08-23 PROBLEM — D64.9 POSTOPERATIVE ANEMIA: Status: RESOLVED | Noted: 2022-08-23 | Resolved: 2022-08-23

## 2022-08-23 PROBLEM — J18.9 ATYPICAL PNEUMONIA: Status: ACTIVE | Noted: 2022-08-23

## 2022-08-23 PROBLEM — N17.9 AKI (ACUTE KIDNEY INJURY): Status: ACTIVE | Noted: 2022-08-23

## 2022-08-23 PROBLEM — N17.9 AKI (ACUTE KIDNEY INJURY): Status: RESOLVED | Noted: 2022-08-23 | Resolved: 2022-08-23

## 2022-08-23 PROBLEM — J18.9 ATYPICAL PNEUMONIA: Status: RESOLVED | Noted: 2022-08-23 | Resolved: 2022-08-23

## 2022-08-23 LAB
ALBUMIN SERPL-MCNC: 2.1 GM/DL (ref 3.4–4.8)
ALBUMIN/GLOB SERPL: 0.7 RATIO (ref 1.1–2)
ALP SERPL-CCNC: 80 UNIT/L (ref 40–150)
ALT SERPL-CCNC: 19 UNIT/L (ref 0–55)
AST SERPL-CCNC: 22 UNIT/L (ref 5–34)
BILIRUBIN DIRECT+TOT PNL SERPL-MCNC: 0.4 MG/DL
BUN SERPL-MCNC: 13.6 MG/DL (ref 9.8–20.1)
CALCIUM SERPL-MCNC: 8.5 MG/DL (ref 8.4–10.2)
CHLORIDE SERPL-SCNC: 98 MMOL/L (ref 98–107)
CO2 SERPL-SCNC: 29 MMOL/L (ref 23–31)
CREAT SERPL-MCNC: 0.67 MG/DL (ref 0.55–1.02)
GFR SERPLBLD CREATININE-BSD FMLA CKD-EPI: >60 MLS/MIN/1.73/M2
GLOBULIN SER-MCNC: 3 GM/DL (ref 2.4–3.5)
GLUCOSE SERPL-MCNC: 79 MG/DL (ref 82–115)
HCT VFR BLD AUTO: 29.9 % (ref 37–47)
HGB BLD-MCNC: 9.3 GM/DL (ref 12–16)
POTASSIUM SERPL-SCNC: 3.9 MMOL/L (ref 3.5–5.1)
PROT SERPL-MCNC: 5.1 GM/DL (ref 5.8–7.6)
SODIUM SERPL-SCNC: 136 MMOL/L (ref 136–145)

## 2022-08-23 PROCEDURE — 80053 COMPREHEN METABOLIC PANEL: CPT | Performed by: INTERNAL MEDICINE

## 2022-08-23 PROCEDURE — 97530 THERAPEUTIC ACTIVITIES: CPT

## 2022-08-23 PROCEDURE — 97110 THERAPEUTIC EXERCISES: CPT

## 2022-08-23 PROCEDURE — 25000003 PHARM REV CODE 250: Performed by: NURSE PRACTITIONER

## 2022-08-23 PROCEDURE — 85014 HEMATOCRIT: CPT | Performed by: INTERNAL MEDICINE

## 2022-08-23 PROCEDURE — 36415 COLL VENOUS BLD VENIPUNCTURE: CPT | Performed by: INTERNAL MEDICINE

## 2022-08-23 PROCEDURE — 25000003 PHARM REV CODE 250: Performed by: INTERNAL MEDICINE

## 2022-08-23 RX ORDER — ACETAMINOPHEN 325 MG/1
650 TABLET ORAL EVERY 6 HOURS PRN
Refills: 0
Start: 2022-08-23

## 2022-08-23 RX ORDER — ENOXAPARIN SODIUM 100 MG/ML
40 INJECTION SUBCUTANEOUS DAILY
Start: 2022-08-23

## 2022-08-23 RX ORDER — POLYETHYLENE GLYCOL 3350 17 G/17G
17 POWDER, FOR SOLUTION ORAL DAILY
Refills: 0
Start: 2022-08-23

## 2022-08-23 RX ORDER — METHOCARBAMOL 500 MG/1
500 TABLET, FILM COATED ORAL EVERY 8 HOURS PRN
Start: 2022-08-23 | End: 2022-09-02

## 2022-08-23 RX ADMIN — MIDODRINE HYDROCHLORIDE 5 MG: 5 TABLET ORAL at 08:08

## 2022-08-23 RX ADMIN — BACLOFEN 10 MG: 10 TABLET ORAL at 08:08

## 2022-08-23 RX ADMIN — ACETAMINOPHEN 650 MG: 325 TABLET ORAL at 09:08

## 2022-08-23 RX ADMIN — PANTOPRAZOLE SODIUM 40 MG: 40 TABLET, DELAYED RELEASE ORAL at 08:08

## 2022-08-23 RX ADMIN — GABAPENTIN 300 MG: 300 CAPSULE ORAL at 08:08

## 2022-08-23 RX ADMIN — OXYBUTYNIN CHLORIDE 5 MG: 5 TABLET ORAL at 05:08

## 2022-08-23 RX ADMIN — POLYETHYLENE GLYCOL 3350 17 G: 17 POWDER, FOR SOLUTION ORAL at 08:08

## 2022-08-23 RX ADMIN — MEMANTINE 5 MG: 5 TABLET ORAL at 08:08

## 2022-08-23 RX ADMIN — AMLODIPINE BESYLATE 5 MG: 5 TABLET ORAL at 08:08

## 2022-08-23 NOTE — DISCHARGE SUMMARY
[facility] Hospital Medicine Discharge Summary    Admit Date: 8/17/2022  Discharge Date and Time: 8/23/202211:23 AM  Admitting Physician: MEMO Team  Discharging Physician: Juan Jose Medina MD.  Primary Care Physician: Primary Doctor No  Consults: Orthopedic Surgery    Discharge Diagnoses:  Left hip fracture secondary to ground level fall at home  s/p L hip hemiarthoplasty 8/19  Postop anemia- requiring transfusion  ? Community-acquired pneumonia vs residual from recent covid infection - treated  Acute kidney injury- resolved    Transaminitis - resolved   Essential hypertension   HIV positive on Cabenuva q monthly- viral load undetected          Hospital Course:   72-year-old female with known past medical history of hypertension, hyperlipidemia, HIV positive on cabenuva with HIV viral load undetectable, dementia, admitted 08/18/2022 after cleaning up for on 08/17/2022 at her home falling on her left hip, patient reports he did not hit her head.  Complained of pain in the left hip as well.  Patient reports she was able to get up from the fall but was unable to bear weight on that leg.   Patient and her sister lives together, sister is at bedside informed that there just recovering from COVID infection 2 weeks ago, did not needed to be admitted to the hospital, was treated outpatient with antibiotic by their PCP.  They are both vaccinated and boosted.  Patient still complains of nonproductive cough.  Denies chest pain shortness of breath fever abdominal pain nausea vomiting diarrhea or constipation.  No urinary symptoms as well.  Patient reports at home she usually walks with a walker and does not need assistance with activities of daily living   In the ER patient was noted to have lower oxygen saturation 91% so she was started on supplemental oxygen via nasal cannula.  Mild leukocytosis of 13.4, liver enzymes were normal yesterday but this morning it has significantly increased.  COVID rapid  was negative.  X-ray chest without acute pulmonary processes but prominent interstitial marking without focal opacification.  Hip x-ray confirmed left hip fracture.   Discussed orthopedic has been consulted, will keep her NPO till the evaluate for probable surgery today.  Verbalized understanding   Patient underwent left hip osiel arthroplasty on 08/19 with Dr. Gomez.  Pt started physical therapy postop.  Noted postop acute anemia but starting to trend up without transfusion  PT OT recommended Home with HH if sister can provide all the help but pt and sister both agreed it will be difficult at home so requested rehab before going home   CM on board   Admitted as inpatient on 8/18   Ortho on board, status post left hip hemiarthroplasty on 08/19  Postop day 4  Noted postop anemia with hemoglobin dropped to 7.6, --> 8.0--> 7.2  Received 1 unit of packed red blood cell transfusion 8/22  Hb improved to 9.3  IV Morphine for pain, noted pt allergic to Codeine, gives her palpitations and chest heaviness    Completed Rocephin+ azithromycin for suspected CAP, though it could have been the residual changes after her Covid infection.  8/19- 1 fever spike of 101.5, order blood cultures --> negative to date  Right upper quadrant ultrasound ordered for elevated liver enzymes --> Heterogeneous echotexture of the liver may be related to steatosis, hepatocellular disease or infiltrative lesions.  This is poorly characterized on the current exam due to poor acoustic window   Acute hep panel --> nonreactive  Liver enzymes normalized  D/C IVF as patient consuming orally  IV hydralazine as needed for hypertension   Resumed appropriate home medication for chronic medical condition  Added Melatonin 6mg nightly as pt reported unable to sleep   Hold antihypertensive medication it is if systolic blood pressure less than 110  Continue midodrine  Had a long conversation with the patient regarding her chronic constipation, recommended MiraLax  and Metamucil daily to prevent constipation.  Patient did had a good bowel movement last night.  Resumed statin on discharge given liver enzyme normalized  At 11:00 a.m., Case Management informed me that patient has been accepted to [facility] acute Rehab and requested discharge orders.  Patient is ready for discharge and medically stable cleared for discharge  Pt was seen and examined on the day of discharge  Vitals:  VITAL SIGNS: 24 HRS MIN & MAX LAST   Temp  Min: 98.3 °F (36.8 °C)  Max: 99.4 °F (37.4 °C) 98.3 °F (36.8 °C)   BP  Min: 107/68  Max: 135/76 107/68   Pulse  Min: 64  Max: 103  72   Resp  Min: 16  Max: 20 17   SpO2  Min: 85 %  Max: 96 % (!) 90 %       Physical Exam:  General:   sitting in bed  Chest: Clear to auscultation bilaterally anteriorly   Heart: S1, S2, no appreciable murmur   Abdomen: Soft, nontender, BS +   MSK:  Dressing left leg  Neurologic: Alert and oriented x4, cranial nerves 2-12 grossly intact.  Answered all the questions appropriately     Procedures Performed: No admission procedures for hospital encounter.     Significant Diagnostic Studies: See Full reports for all details    Recent Labs   Lab 08/20/22  0637 08/21/22  0508 08/22/22  0530 08/23/22  0337   WBC 11.4 10.2 8.1  --    RBC 2.54* 2.74* 2.48*  --    HGB 7.4* 8.0* 7.2* 9.3*   HCT 23.9* 26.0* 23.7* 29.9*   MCV 94.1* 94.9* 95.6*  --    MCH 29.1 29.2 29.0  --    MCHC 31.0* 30.8* 30.4*  --    RDW 14.2 14.7 14.7  --    * 177 205  --    MPV 10.8* 10.9* 9.9  --        Recent Labs   Lab 08/18/22  0459 08/18/22  1114 08/20/22  0637 08/21/22  0508 08/23/22  0337      < > 139 141 136   K 4.9   < > 4.0 4.0 3.9   CO2 17*   < > 24 27 29   BUN 18.6   < > 12.6 9.9 13.6   CREATININE 1.41*   < > 0.73 0.69 0.67   CALCIUM 9.1   < > 8.0* 8.2* 8.5   MG 2.00  --   --   --   --    ALBUMIN 3.2*   < > 2.3* 2.3* 2.1*   ALKPHOS 161*   < > 90 96 80   *   < > 49 36 19   *   < > 48* 34 22   BILITOT 0.9   < > 0.3 0.4 0.4    <  > = values in this interval not displayed.        Microbiology Results (last 7 days)     Procedure Component Value Units Date/Time    Blood Culture [[id number]]  (Normal) Collected: 08/19/22 0823    Order Status: Completed Specimen: Blood from Wrist, Right Updated: 08/23/22 1002     CULTURE, BLOOD ([facility]) No Growth At 96 Hours           X-Ray Hip 2 or 3 views Left (with Pelvis when performed)  Narrative: EXAMINATION:  XR HIP WITH PELVIS WHEN PERFORMED, 2 OR 3 VIEWS LEFT    CLINICAL HISTORY:  post op;    COMPARISON:  None.    FINDINGS:  Left hip prostheses in good anatomic alignment and position only 1 projection provided    No acute displaced fractures or dislocations.    Joint spaces preserved.    No blastic or lytic lesions.    Soft tissues within normal limits.  Impression: Left hip prostheses.    Electronically signed by: Emiliano Esteban  Date:    08/19/2022  Time:    13:06         Medication List      START taking these medications    acetaminophen 325 MG tablet  Commonly known as: TYLENOL  Take 2 tablets (650 mg total) by mouth every 6 (six) hours as needed for Pain.     enoxaparin 40 mg/0.4 mL Syrg  Commonly known as: LOVENOX  Inject 0.4 mLs (40 mg total) into the skin once daily.     methocarbamoL 500 MG Tab  Commonly known as: ROBAXIN  Take 1 tablet (500 mg total) by mouth every 8 (eight) hours as needed (spasm).     polyethylene glycol 17 gram Pwpk  Commonly known as: GLYCOLAX  Take 17 g by mouth once daily.     psyllium powder  Commonly known as: METAMUCIL  Take 1 packet by mouth once daily.        CONTINUE taking these medications    amLODIPine 5 MG tablet  Commonly known as: NORVASC     aspirin 81 MG EC tablet  Commonly known as: ECOTRIN     baclofen 10 MG tablet  Commonly known as: LIORESAL     CABENUVA IM     docusate sodium 100 MG capsule  Commonly known as: COLACE     donepeziL 5 MG tablet  Commonly known as: ARICEPT     gabapentin 300 MG capsule  Commonly known as: NEURONTIN     melatonin 10 mg  Cap     memantine 10 MG Tab  Commonly known as: NAMENDA     midodrine 5 MG Tab  Commonly known as: PROAMATINE     oxybutynin 10 MG 24 hr tablet  Commonly known as: DITROPAN-XL     pantoprazole 40 MG tablet  Commonly known as: PROTONIX     rosuvastatin 40 MG Tab  Commonly known as: CRESTOR     traZODone 100 MG tablet  Commonly known as: DESYREL     TRINTELLIX 10 mg Tab  Generic drug: vortioxetine     VITAMIN D3 125 mcg (5,000 unit) Tab  Generic drug: cholecalciferol (vitamin D3)           Where to Get Your Medications      You can get these medications from any pharmacy    You don't need a prescription for these medications  · psyllium powder     Information about where to get these medications is not yet available    Ask your nurse or doctor about these medications  · acetaminophen 325 MG tablet  · enoxaparin 40 mg/0.4 mL Syrg  · methocarbamoL 500 MG Tab  · polyethylene glycol 17 gram Pwpk          Explained in detail to the patient about the discharge plan, medications, and follow-up visits. Pt understands and agrees with the treatment plan  Discharge Disposition:   [facility] acute rehab   Discharged Condition: stable  Diet-   Dietary Orders (From admission, onward)     Start     Ordered    08/19/22 1826  Diet Adult Regular  Diet effective now         08/19/22 1825               Medications Per DC med rec  Activities as tolerated   Follow-up Information     Florentin Gomez MD. Schedule an appointment as soon as possible for a visit in 3 week(s).    Specialty: Orthopedic Surgery  Why: post hospital discharge for incision check  Contact information:  [address] [place] [id number] [phone]                       For further questions contact hospitalist office    Discharge time 35 minutes    For worsening symptoms, chest pain, shortness of breath, increased abdominal pain, high grade fever, stroke or stroke like symptoms, immediately go to the nearest Emergency Room or call 911 as soon as  amanda Escobar M.D on 8/23/2022. at 11:23 AM.

## 2022-08-23 NOTE — PT/OT/SLP PROGRESS
Physical Therapy Treatment    Patient Name:  Susy Foy   MRN:  [id number]    Recommendations:     Discharge Recommendations:  rehabilitation facility   Discharge Equipment Recommendations: walker, rolling   Barriers to discharge: acuity of illness    Assessment:     Susy Foy is a 72 y.o. female admitted with a medical diagnosis of Left displaced femoral neck fracture.  She presents with the following impairments/functional limitations:  weakness, impaired endurance, impaired functional mobility, gait instability, impaired balance, decreased lower extremity function, pain, decreased ROM, orthopedic precautions. Pt is making slow steady progress toward functional mobility goals.     Rehab Prognosis: Good; patient would benefit from acute skilled PT services to address these deficits and reach maximum level of function.    Recent Surgery: Procedure(s) (LRB):  HEMIARTHROPLASTY, HIP (Left) 4 Days Post-Op    Plan:     During this hospitalization, patient to be seen daily to BID to address the identified rehab impairments via gait training, therapeutic activities, therapeutic exercises, neuromuscular re-education and progress toward the following goals:    · Plan of Care Expires:  09/22/22    Subjective     Chief Complaint: pain  Patient/Family Comments/goals: to get stronger  Pain/Comfort:  · Pain Rating 1: 8/10  · Location - Side 1: Left  · Location 1: hip  · Pain Addressed 1: Nurse notified, Cessation of Activity      Objective:     Communicated with RN prior to session.  Patient found HOB elevated with peripheral IV, PureWick upon PT entry to room.     General Precautions: Standard, fall   Orthopedic Precautions:LLE weight bearing as tolerated, hip pxns.   Braces: N/A  Respiratory Status: Room air     Functional Mobility:  · Bed Mobility:     · Scooting: contact guard assistance  · Supine to Sit: moderate assistance  · Transfers:     · Sit to Stand:  minimum assistance with rolling walker  · Gait: pt  demo'd a very slow step to gt pattern x a total of 12 feet w/ 1 seated rest break required. Pt utilized RW and required Sandi for balance.       AM-PAC 6 CLICK MOBILITY  Turning over in bed (including adjusting bedclothes, sheets and blankets)?: 3  Sitting down on and standing up from a chair with arms (e.g., wheelchair, bedside commode, etc.): 3  Moving from lying on back to sitting on the side of the bed?: 2  Moving to and from a bed to a chair (including a wheelchair)?: 3  Need to walk in hospital room?: 3  Climbing 3-5 steps with a railing?: 2  Basic Mobility Total Score: 16     Exercises:  Pt performed 15 reps of LAQ, calf raises, marches, hip abd, glut sets, quad set.    Patient left up in chair with all lines intact, call button in reach, RN notified and DIL present. Pt maintained throughout session.     GOALS:   Multidisciplinary Problems     Physical Therapy Goals        Problem: Physical Therapy    Goal Priority Disciplines Outcome Goal Variances Interventions   Physical Therapy Goal     PT, PT/OT Ongoing, Progressing     Description: Goals to be met by: 22    Patient will increase functional independence with mobility by performin. Supine to sit with Stand-by Assistance  2. Sit to supine with Stand-by Assistance  3. Sit to stand transfer with Stand-by Assistance  4. Gait  x 150 feet with Stand-by Assistance using Rolling Walker.                      Time Tracking:     PT Received On: 22  PT Start Time: 853     PT Stop Time: 09  PT Total Time (min): 23 min     Billable Minutes: Therapeutic Activity 15 mins and Therapeutic Exercise 8 mins    Treatment Type: Treatment  PT/PTA: PT     PTA Visit Number: 2     2022

## 2022-08-23 NOTE — PROGRESS NOTES
"Nutrition   Progress Note      Recommendations:  1. Continue regular diet as tolerated  2. RD to monitor po intake and weight changes      Reason for Evaluation:  Length of Stay    Diagnosis:    1. Left displaced femoral neck fracture    2. Hypoxia    3. Atypical pneumonia    4. Leukocytosis, unspecified type    5. Chest pain    6. Encounter for preoperative anesthesiology assessment for vascular surgery    7. Chronic constipation    8. HIV disease    9. Atypical pneumonia- probably residual from recent covid infection in  July     10. Primary hypertension    11. LUKASZ (acute kidney injury)    12. Postoperative anemia requiring blood transfusion    13. Left displaced femoral neck fracture s/p L hemiarthroplasty 8/19        Relevant Medical History:    Past Medical History:   Diagnosis Date    Depression     Hypertension     Mixed hyperlipidemia     Stress incontinence of urine          Nutrition Diet History:    Factors affecting nutritional intake: constipation    Food / Yazdanism / Culture Preferences:  n/a      Nutrition Prescription Ordered:    Current Diet Order: regular    Appetite:  Fair (50% - 75% po intake)    PO intake: 50 - 75 %      Labs / Medications / Procedures:    Nutrition Related Medications: NaCl PRN, suppository PRN, colace nightly, Zofran PRN, Protonix daily, Miralax daily    Nutrition Related Labs:  8/23: H/H-9.3/29.9, glu-79      Anthropometrics:  Height: 5' 5" (1.651 m)  Admit Weight:  Weight: 77.1 kg (170 lb)  Latest Weight:  77.1 kg (169 lb 15.6 oz)    Wt Readings from Last 5 Encounters:   08/18/22 77.1 kg (169 lb 15.6 oz)     IBW: 56.8 kg  %IBW: 135.7%  UBW: 85 kg  %Weight Change: 9.3%  Body mass index is 28.29 kg/m².  BMI classification:  Overweight (BMI 25 - 29.9)      Nutrition Narrative:  8/23: pt reports fair appetite, eating 50% since admission; pt stated normal appetite at home is similar and refused ONS. Pt with constipation; meds noted in chart. UBW reported 185-190 lb with no " recent weight loss. Previous weight of 85 kg (187 lb) on 8/10/22 (used as UBW); likely admit weight of 77.1 kg (169 lb) weigh error. Not likely 18 lb (9.3%) weight loss in 8 days. Pt sitting in chair during visit, so weight not taken. Will continue to monitor.    Monitoring and Evaluation:    Nutrition Monitoring and Evaluation:  food and beverage intake and weight change    Nutrition Risk:  Level of Nutrition Risk:  Low  Frequency of Follow up:  Dietitian will f/up within 7 days.      Letty Lomeli, Registration Eligible, Provisional LDN

## 2022-08-23 NOTE — PLAN OF CARE
Pt had BM last night and is now ready for [facility]. [facility] confirms they will accept. Report to be called to [phone]. Communication sent to  for discharge order/med rec. I will set up [facility] or [facility] to transport. Pt has to be there by 3 pm.

## 2022-08-23 NOTE — PLAN OF CARE
Problem: Adult Inpatient Plan of Care  Goal: Patient-Specific Goal (Individualized)  Outcome: Ongoing, Progressing  Flowsheets (Taken 8/22/2022 2234)  Patient-Specific Goals (Include Timeframe): to have a BM so I can go     Problem: Skin Injury Risk Increased  Goal: Skin Health and Integrity  Outcome: Ongoing, Progressing     Problem: Fall Injury Risk  Goal: Absence of Fall and Fall-Related Injury  Outcome: Ongoing, Progressing

## 2022-08-23 NOTE — NURSING
Report called to Seema CARBALLO at [facility], all belongings packed up, sister at bedside, Longterm PIV removed, awaiting [facility] transport

## 2022-08-24 LAB — BACTERIA BLD CULT: NORMAL

## 2022-08-25 LAB
ESTROGEN SERPL-MCNC: NORMAL PG/ML
INSULIN SERPL-ACNC: NORMAL U[IU]/ML
LAB AP CLINICAL INFORMATION: NORMAL
LAB AP GROSS DESCRIPTION: NORMAL
LAB AP REPORT FOOTNOTES: NORMAL
T3RU NFR SERPL: NORMAL %

## 2022-09-20 ENCOUNTER — HOSPITAL ENCOUNTER (OUTPATIENT)
Dept: RADIOLOGY | Facility: CLINIC | Age: 72
Discharge: HOME | End: 2022-09-20
Attending: ORTHOPAEDIC SURGERY
Payer: MEDICARE

## 2022-09-20 ENCOUNTER — OFFICE VISIT (OUTPATIENT)
Dept: ORTHOPEDICS | Facility: CLINIC | Age: 72
End: 2022-09-20
Payer: MEDICARE

## 2022-09-20 VITALS
WEIGHT: 170 LBS | BODY MASS INDEX: 28.32 KG/M2 | RESPIRATION RATE: 18 BRPM | SYSTOLIC BLOOD PRESSURE: 107 MMHG | HEIGHT: 65 IN | HEART RATE: 72 BPM | DIASTOLIC BLOOD PRESSURE: 68 MMHG

## 2022-09-20 DIAGNOSIS — S72.002D CLOSED FRACTURE OF NECK OF LEFT FEMUR WITH ROUTINE HEALING, SUBSEQUENT ENCOUNTER: ICD-10-CM

## 2022-09-20 DIAGNOSIS — S72.002D CLOSED FRACTURE OF NECK OF LEFT FEMUR WITH ROUTINE HEALING, SUBSEQUENT ENCOUNTER: Primary | ICD-10-CM

## 2022-09-20 PROCEDURE — 99024 POSTOP FOLLOW-UP VISIT: CPT | Mod: POP,,, | Performed by: ORTHOPAEDIC SURGERY

## 2022-09-20 PROCEDURE — 73502 XR HIP WITH PELVIS WHEN PERFORMED, 2 OR 3 VIEWS LEFT: ICD-10-PCS | Mod: LT,,, | Performed by: ORTHOPAEDIC SURGERY

## 2022-09-20 PROCEDURE — 73502 X-RAY EXAM HIP UNI 2-3 VIEWS: CPT | Mod: LT,,, | Performed by: ORTHOPAEDIC SURGERY

## 2022-09-20 PROCEDURE — 99024 PR POST-OP FOLLOW-UP VISIT: ICD-10-PCS | Mod: POP,,, | Performed by: ORTHOPAEDIC SURGERY

## 2022-09-20 RX ORDER — HYDROCODONE BITARTRATE AND ACETAMINOPHEN 5; 325 MG/1; MG/1
1 TABLET ORAL EVERY 8 HOURS PRN
Qty: 21 TABLET | Refills: 0 | Status: SHIPPED | OUTPATIENT
Start: 2022-09-20 | End: 2022-09-27

## 2022-09-29 ENCOUNTER — DOCUMENT SCAN (OUTPATIENT)
Dept: HOME HEALTH SERVICES | Facility: HOSPITAL | Age: 72
End: 2022-09-29
Payer: MEDICARE

## 2022-10-12 ENCOUNTER — DOCUMENT SCAN (OUTPATIENT)
Dept: HOME HEALTH SERVICES | Facility: HOSPITAL | Age: 72
End: 2022-10-12
Payer: MEDICARE

## 2022-11-17 ENCOUNTER — OFFICE VISIT (OUTPATIENT)
Dept: ORTHOPEDICS | Facility: CLINIC | Age: 72
End: 2022-11-17
Payer: MEDICARE

## 2022-11-17 ENCOUNTER — HOSPITAL ENCOUNTER (OUTPATIENT)
Dept: RADIOLOGY | Facility: CLINIC | Age: 72
Discharge: HOME | End: 2022-11-17
Attending: NURSE PRACTITIONER
Payer: MEDICARE

## 2022-11-17 VITALS
DIASTOLIC BLOOD PRESSURE: 77 MMHG | HEIGHT: 65 IN | WEIGHT: 169 LBS | HEART RATE: 63 BPM | SYSTOLIC BLOOD PRESSURE: 129 MMHG | TEMPERATURE: 97 F | BODY MASS INDEX: 28.16 KG/M2

## 2022-11-17 DIAGNOSIS — S72.002D CLOSED FRACTURE OF NECK OF LEFT FEMUR WITH ROUTINE HEALING, SUBSEQUENT ENCOUNTER: Primary | ICD-10-CM

## 2022-11-17 DIAGNOSIS — S72.002D CLOSED FRACTURE OF NECK OF LEFT FEMUR WITH ROUTINE HEALING, SUBSEQUENT ENCOUNTER: ICD-10-CM

## 2022-11-17 PROCEDURE — 73502 XR HIP WITH PELVIS WHEN PERFORMED, 2 OR 3 VIEWS LEFT: ICD-10-PCS | Mod: LT,,, | Performed by: NURSE PRACTITIONER

## 2022-11-17 PROCEDURE — 99024 PR POST-OP FOLLOW-UP VISIT: ICD-10-PCS | Mod: POP,,, | Performed by: NURSE PRACTITIONER

## 2022-11-17 PROCEDURE — 73502 X-RAY EXAM HIP UNI 2-3 VIEWS: CPT | Mod: LT,,, | Performed by: NURSE PRACTITIONER

## 2022-11-17 PROCEDURE — 99024 POSTOP FOLLOW-UP VISIT: CPT | Mod: POP,,, | Performed by: NURSE PRACTITIONER

## 2022-11-17 NOTE — PROGRESS NOTES
Subjective:       Patient ID: Susy Foy is a 72 y.o. female.    Chief Complaint   Patient presents with    Left Hip - Follow-up     3 MONTH F/U FROM LEFT HIP HEMIARTHROPLASTY . AMBULATING WITH WALKER. DISCOMFORT WITH WALKING.         The patient is here today for a follow-up evaluation 3 months out from a left hip hemiarthroplasty for a femoral neck fracture.  She states she is doing well today.  She does have some continued soreness to the hip as expected but states that it is improving.  She is ambulatory with a walker.  She states that she is able to walk short distances in her home with no assistive device.  She does report that she continues to have a limp.  She has not had any fever or redness/drainage to her incisions.  She states that she has few pain pills left and she uses them very seldomly when she knows she will be doing longer distance walking.  She has no other complaints or issues to report today.      Review of Systems   Constitutional: Negative for chills and fever.   HENT:  Negative for congestion and hearing loss.    Eyes:  Negative for visual disturbance.   Cardiovascular:  Negative for chest pain and syncope.   Respiratory:  Negative for cough and shortness of breath.    Hematologic/Lymphatic: Does not bruise/bleed easily.   Skin:  Negative for color change and rash.   Gastrointestinal:  Negative for abdominal pain, nausea and vomiting.   Genitourinary:  Negative for dysuria and hematuria.   Neurological:  Negative for numbness, sensory change and weakness.   Psychiatric/Behavioral:  Negative for altered mental status.       Current Outpatient Medications on File Prior to Visit   Medication Sig Dispense Refill    acetaminophen (TYLENOL) 325 MG tablet Take 2 tablets (650 mg total) by mouth every 6 (six) hours as needed for Pain.  0    amLODIPine (NORVASC) 5 MG tablet Take 5 mg by mouth once daily.      aspirin (ECOTRIN) 81 MG EC tablet Take 81 mg by mouth once daily.      baclofen  "(LIORESAL) 10 MG tablet Take 10 mg by mouth 3 (three) times daily.      cabotegravir/rilpivirine (CABENUVA IM) Inject into the muscle every 30 days.      cholecalciferol, vitamin D3, 125 mcg (5,000 unit) Tab Take 5,000 Units by mouth once daily.      docusate sodium (COLACE) 100 MG capsule Take 100 mg by mouth every evening.      donepeziL (ARICEPT) 5 MG tablet Take 5 mg by mouth every evening.      enoxaparin (LOVENOX) 40 mg/0.4 mL Syrg Inject 0.4 mLs (40 mg total) into the skin once daily.      gabapentin (NEURONTIN) 300 MG capsule Take 300 mg by mouth 2 (two) times daily.      melatonin 10 mg Cap Take by mouth.      memantine (NAMENDA) 10 MG Tab Take 5 mg by mouth 2 (two) times daily.      midodrine (PROAMATINE) 5 MG Tab Take 5 mg by mouth 2 (two) times daily with meals.      oxybutynin (DITROPAN-XL) 10 MG 24 hr tablet Take 10 mg by mouth once daily.      pantoprazole (PROTONIX) 40 MG tablet Take 40 mg by mouth once daily.      polyethylene glycol (GLYCOLAX) 17 gram PwPk Take 17 g by mouth once daily.  0    psyllium (METAMUCIL) powder Take 1 packet by mouth once daily.  12    rosuvastatin (CRESTOR) 40 MG Tab Take 10 mg by mouth every evening.      traZODone (DESYREL) 100 MG tablet Take 100 mg by mouth every evening.      vortioxetine (TRINTELLIX) 10 mg Tab Take by mouth.       No current facility-administered medications on file prior to visit.          Objective:      /77   Pulse 63   Temp 97 °F (36.1 °C)   Ht 5' 5" (1.651 m)   Wt 76.7 kg (169 lb)   LMP  (LMP Unknown)   BMI 28.12 kg/m²   Physical Exam  Constitutional:       General: She is not in acute distress.     Appearance: Normal appearance.   HENT:      Head: Normocephalic and atraumatic.      Mouth/Throat:      Mouth: Mucous membranes are moist.   Eyes:      Extraocular Movements: Extraocular movements intact.   Cardiovascular:      Rate and Rhythm: Normal rate.      Pulses: Normal pulses.   Pulmonary:      Effort: Pulmonary effort is normal. " No respiratory distress.   Abdominal:      General: There is no distension.      Palpations: Abdomen is soft.      Tenderness: There is no abdominal tenderness.   Musculoskeletal:      Cervical back: Normal range of motion and neck supple.      Comments: Left hip:  Surgical incision is well healed with no signs of infection.  Good passive range of motion of the hip without pain or stiffness.  Her thigh is soft and compressible.  No calf tenderness.  5/5 motor strength distally with dorsiflexion and plantar flexion.  Brisk capillary refill distally.  Sensation to light touch intact distally.   Neurological:      Mental Status: She is alert and oriented to person, place, and time. Mental status is at baseline.   Psychiatric:         Mood and Affect: Mood normal.         Behavior: Behavior normal.         Thought Content: Thought content normal.         Judgment: Judgment normal.      Body mass index is 28.12 kg/m².    Radiology:  AP and lateral x-ray left hip demonstrates a well-fixed hemiarthroplasty with no dislocation or loosening        Assessment:         1. Closed fracture of neck of left femur with routine healing, subsequent encounter  X-Ray Hip 2 or 3 views Left (with Pelvis when performed)              Plan:     Patient is doing well 3 months out from a left hip hemiarthroplasty.  She can continue activity as tolerated to the left lower extremity.  Continue walker as needed for balance.  We discussed formal physical therapy but she is not interested at this time.  She was encouraged to perform home exercises for muscle stretching and strengthening.  She may use over-the-counter acetaminophen and NSAIDs as needed for discomfort.  We will see her back in 2 months for repeat x-rays and evaluation.  All questions and concerns were addressed.  The patient and her sister understand and agree with the plan of care.    The above findings, diagnosis, and treatment plan were discussed with Dr. Florentin Gomez who is in  agreement.      Follow up in about 2 months (around 1/17/2023).    Closed fracture of neck of left femur with routine healing, subsequent encounter  -     X-Ray Hip 2 or 3 views Left (with Pelvis when performed); Future; Expected date: 11/17/2022              Orders Placed This Encounter   Procedures    X-Ray Hip 2 or 3 views Left (with Pelvis when performed)     Standing Status:   Future     Number of Occurrences:   1     Standing Expiration Date:   11/17/2023     Order Specific Question:   May the Radiologist modify the order per protocol to meet the clinical needs of the patient?     Answer:   Yes     Order Specific Question:   Release to patient     Answer:   Immediate       No future appointments.

## 2023-01-18 ENCOUNTER — HOSPITAL ENCOUNTER (OUTPATIENT)
Dept: RADIOLOGY | Facility: CLINIC | Age: 73
Discharge: HOME | End: 2023-01-18
Attending: ORTHOPAEDIC SURGERY
Payer: MEDICARE

## 2023-01-18 ENCOUNTER — OFFICE VISIT (OUTPATIENT)
Dept: ORTHOPEDICS | Facility: CLINIC | Age: 73
End: 2023-01-18
Payer: MEDICARE

## 2023-01-18 VITALS — BODY MASS INDEX: 28.17 KG/M2 | WEIGHT: 169.06 LBS | HEIGHT: 65 IN | RESPIRATION RATE: 18 BRPM | HEART RATE: 80 BPM

## 2023-01-18 DIAGNOSIS — S72.002D CLOSED FRACTURE OF NECK OF LEFT FEMUR WITH ROUTINE HEALING, SUBSEQUENT ENCOUNTER: ICD-10-CM

## 2023-01-18 DIAGNOSIS — S72.002D CLOSED FRACTURE OF NECK OF LEFT FEMUR WITH ROUTINE HEALING, SUBSEQUENT ENCOUNTER: Primary | ICD-10-CM

## 2023-01-18 PROCEDURE — 99212 PR OFFICE/OUTPT VISIT, EST, LEVL II, 10-19 MIN: ICD-10-PCS | Mod: ,,, | Performed by: NURSE PRACTITIONER

## 2023-01-18 PROCEDURE — 73502 XR HIP WITH PELVIS WHEN PERFORMED, 2 OR 3 VIEWS LEFT: ICD-10-PCS | Mod: LT,,, | Performed by: ORTHOPAEDIC SURGERY

## 2023-01-18 PROCEDURE — 99212 OFFICE O/P EST SF 10 MIN: CPT | Mod: ,,, | Performed by: NURSE PRACTITIONER

## 2023-01-18 PROCEDURE — 73502 X-RAY EXAM HIP UNI 2-3 VIEWS: CPT | Mod: LT,,, | Performed by: ORTHOPAEDIC SURGERY

## 2023-01-18 NOTE — PROGRESS NOTES
Subjective:       Patient ID: Susy Foy is a 72 y.o. female.    Chief Complaint   Patient presents with    Left Hip - Follow-up     5 MONTH F/U LEFT HIP HEMIARTHROPLASTY, AMBULATES WITHOUT ASSISTANCE, NO COMPLAINTS.         The patient is here today for a follow-up evaluation 5 months out from a left hip hemiarthroplasty for a femoral neck fracture.  She is doing very well today.  She reports mild intermittent soreness which is improved with an occasional dose of ibuprofen.  She is ambulating with no assistive device.  She performs her normal activities of daily living without any issues.  She is not had any fever or redness/drainage her incision.  She is happy with her outcome and has no complaints today.      Review of Systems   Constitutional: Negative for chills and fever.   HENT:  Negative for congestion and hearing loss.    Eyes:  Negative for visual disturbance.   Cardiovascular:  Negative for chest pain and syncope.   Respiratory:  Negative for cough and shortness of breath.    Hematologic/Lymphatic: Does not bruise/bleed easily.   Skin:  Negative for color change and rash.   Gastrointestinal:  Negative for abdominal pain, nausea and vomiting.   Genitourinary:  Negative for dysuria and hematuria.   Neurological:  Negative for numbness, sensory change and weakness.   Psychiatric/Behavioral:  Negative for altered mental status.       Current Outpatient Medications on File Prior to Visit   Medication Sig Dispense Refill    acetaminophen (TYLENOL) 325 MG tablet Take 2 tablets (650 mg total) by mouth every 6 (six) hours as needed for Pain.  0    amLODIPine (NORVASC) 5 MG tablet Take 5 mg by mouth once daily.      aspirin (ECOTRIN) 81 MG EC tablet Take 81 mg by mouth once daily.      baclofen (LIORESAL) 10 MG tablet Take 10 mg by mouth 3 (three) times daily.      cabotegravir/rilpivirine (CABENUVA IM) Inject into the muscle every 30 days.      cholecalciferol, vitamin D3, 125 mcg (5,000 unit) Tab Take  "5,000 Units by mouth once daily.      docusate sodium (COLACE) 100 MG capsule Take 100 mg by mouth every evening.      donepeziL (ARICEPT) 5 MG tablet Take 5 mg by mouth every evening.      gabapentin (NEURONTIN) 300 MG capsule Take 300 mg by mouth 2 (two) times daily.      melatonin 10 mg Cap Take by mouth.      memantine (NAMENDA) 10 MG Tab Take 5 mg by mouth 2 (two) times daily.      midodrine (PROAMATINE) 5 MG Tab Take 5 mg by mouth 2 (two) times daily with meals.      oxybutynin (DITROPAN-XL) 10 MG 24 hr tablet Take 10 mg by mouth once daily.      pantoprazole (PROTONIX) 40 MG tablet Take 40 mg by mouth once daily.      psyllium (METAMUCIL) powder Take 1 packet by mouth once daily.  12    rosuvastatin (CRESTOR) 40 MG Tab Take 10 mg by mouth every evening.      traZODone (DESYREL) 100 MG tablet Take 100 mg by mouth every evening.      vortioxetine (TRINTELLIX) 10 mg Tab Take by mouth.      enoxaparin (LOVENOX) 40 mg/0.4 mL Syrg Inject 0.4 mLs (40 mg total) into the skin once daily. (Patient not taking: Reported on 1/18/2023)      polyethylene glycol (GLYCOLAX) 17 gram PwPk Take 17 g by mouth once daily. (Patient not taking: Reported on 1/18/2023)  0     No current facility-administered medications on file prior to visit.          Objective:      Pulse 80   Resp 18   Ht 5' 5" (1.651 m)   Wt 76.7 kg (169 lb 1.5 oz)   BMI 28.14 kg/m²   Physical Exam  Constitutional:       General: She is not in acute distress.     Appearance: Normal appearance.   HENT:      Head: Normocephalic and atraumatic.      Mouth/Throat:      Mouth: Mucous membranes are moist.   Eyes:      Extraocular Movements: Extraocular movements intact.   Cardiovascular:      Rate and Rhythm: Normal rate.      Pulses: Normal pulses.   Pulmonary:      Effort: Pulmonary effort is normal. No respiratory distress.   Abdominal:      General: There is no distension.      Palpations: Abdomen is soft.      Tenderness: There is no abdominal tenderness. "   Musculoskeletal:      Cervical back: Normal range of motion and neck supple.      Comments: Left hip:  Surgical incision is well healed with no signs of infection.  Good passive range of motion of the hip and knee without pain.  No significant stiffness.  No swelling to the thigh.  No calf tenderness.  5/5 motor strength distally with dorsiflexion and plantar flexion.  Brisk capillary refill distally.  Smooth gait with no assistive device.   Neurological:      Mental Status: She is alert and oriented to person, place, and time. Mental status is at baseline.   Psychiatric:         Mood and Affect: Mood normal.         Behavior: Behavior normal.         Thought Content: Thought content normal.         Judgment: Judgment normal.      Body mass index is 28.14 kg/m².    Radiology:  AP and lateral x-ray left hip:  Well-fixed hemiarthroplasty with no dislocation or loosening        Assessment:         1. Closed fracture of neck of left femur with routine healing, subsequent encounter  X-Ray Hip 2 or 3 views Left (with Pelvis when performed)    CANCELED: X-Ray Hip 2 or 3 views Left (with Pelvis when performed)              Plan:     Patient is doing well 5 months out from a left hip hemiarthroplasty.  She has a well-fixed hemiarthroplasty on x-rays.  She is no pain and has resumed full activities.  She can continue full activity as tolerated with no restrictions.  She can follow-up with us on an as-needed basis for any issues or concerns.  Patient is happy with her outcome and understands and agrees with the plan.  All questions were addressed.    The above findings, diagnosis, and treatment plan were discussed with Dr. Florentin Gomez who is in agreement.      Follow up if symptoms worsen or fail to improve.    Closed fracture of neck of left femur with routine healing, subsequent encounter  -     Cancel: X-Ray Hip 2 or 3 views Left (with Pelvis when performed); Future  -     X-Ray Hip 2 or 3 views Left (with Pelvis when  performed); Future; Expected date: 01/18/2023              Orders Placed This Encounter   Procedures    X-Ray Hip 2 or 3 views Left (with Pelvis when performed)     Standing Status:   Future     Number of Occurrences:   1     Standing Expiration Date:   1/18/2024     Order Specific Question:   May the Radiologist modify the order per protocol to meet the clinical needs of the patient?     Answer:   Yes     Order Specific Question:   Release to patient     Answer:   Immediate       No future appointments.

## (undated) DEVICE — BLADE SAW SAG 22.13MM 0.92MM

## (undated) DEVICE — SHEET XLGE DRAPE

## (undated) DEVICE — SUT PDS PLUS 1 TP1 96IN

## (undated) DEVICE — DRAPE STERI U-SHAPED 47X51IN

## (undated) DEVICE — GAUZE SPONGE 4X4 12PLY

## (undated) DEVICE — KIT SURGICAL TURNOVER

## (undated) DEVICE — RETRIEVER SUTURE HEWSON DISP

## (undated) DEVICE — SUT CTD VICRYL CT-1 27

## (undated) DEVICE — SUT VICRYL BR 1 GEN 27 CT-1

## (undated) DEVICE — Device

## (undated) DEVICE — SUT MONOCRYL 3-0 PS-2 UND

## (undated) DEVICE — PILLOW ABDUCTION FOAM MED

## (undated) DEVICE — CLOSURE SKIN STERI STRIP 1/2X4

## (undated) DEVICE — GLOVE PROTEXIS HYDROGEL SZ7

## (undated) DEVICE — DRAPE INCISE IOBAN 2 23X23IN

## (undated) DEVICE — APPLICATOR CHLORAPREP ORN 26ML

## (undated) DEVICE — SOL NACL IRR 3000ML

## (undated) DEVICE — SYS IRRISEPT 450ML0.05% CHG

## (undated) DEVICE — ELECTRODE PATIENT RETURN DISP

## (undated) DEVICE — COVER FULLGUARD SHOE HIGH-TOP

## (undated) DEVICE — KIT TOTAL HIP HLGC

## (undated) DEVICE — GLOVE 7.0 PROTEXIS PI BLUE

## (undated) DEVICE — COVER TABLE HVY DTY 60X90IN

## (undated) DEVICE — GOWN SURGICAL XX LARGE X LONG

## (undated) DEVICE — SOL NACL IRR 1000ML BTL

## (undated) DEVICE — SUT MONOCRYL 2-0 S UND

## (undated) DEVICE — DRESSING TELFA + RECT 6X10IN

## (undated) DEVICE — SUT 5 30IN ETHIBOND GRN BR

## (undated) DEVICE — TAPE SILK 3IN

## (undated) DEVICE — SUT STRATAFIX 3-0 30CM

## (undated) DEVICE — BLADE PEAK PLASMA

## (undated) DEVICE — SUT FIBERWIRE #5 1/2 X 38